# Patient Record
Sex: FEMALE | Race: WHITE | NOT HISPANIC OR LATINO | Employment: UNEMPLOYED | URBAN - METROPOLITAN AREA
[De-identification: names, ages, dates, MRNs, and addresses within clinical notes are randomized per-mention and may not be internally consistent; named-entity substitution may affect disease eponyms.]

---

## 2018-03-15 ENCOUNTER — OFFICE VISIT (OUTPATIENT)
Dept: SURGERY | Facility: CLINIC | Age: 53
End: 2018-03-15
Payer: COMMERCIAL

## 2018-03-15 VITALS
WEIGHT: 177 LBS | TEMPERATURE: 99 F | SYSTOLIC BLOOD PRESSURE: 133 MMHG | HEART RATE: 97 BPM | BODY MASS INDEX: 29.49 KG/M2 | HEIGHT: 65 IN | DIASTOLIC BLOOD PRESSURE: 84 MMHG

## 2018-03-15 DIAGNOSIS — K44.9 HERNIA, HIATAL: ICD-10-CM

## 2018-03-15 PROCEDURE — 99213 OFFICE O/P EST LOW 20 MIN: CPT | Mod: PBBFAC | Performed by: SURGERY

## 2018-03-15 PROCEDURE — 99999 PR PBB SHADOW E&M-EST. PATIENT-LVL III: CPT | Mod: PBBFAC,,, | Performed by: SURGERY

## 2018-03-15 PROCEDURE — 99203 OFFICE O/P NEW LOW 30 MIN: CPT | Mod: S$GLB,,, | Performed by: SURGERY

## 2018-03-15 RX ORDER — ALPRAZOLAM 1 MG/1
TABLET ORAL
COMMUNITY
Start: 2017-04-13 | End: 2018-10-01

## 2018-03-15 RX ORDER — CARVEDILOL 6.25 MG/1
TABLET ORAL
COMMUNITY
Start: 2018-02-15

## 2018-03-15 RX ORDER — DICLOFENAC SODIUM 10 MG/G
GEL TOPICAL
COMMUNITY
Start: 2018-01-29

## 2018-03-15 RX ORDER — ALPRAZOLAM 0.5 MG/1
TABLET ORAL
COMMUNITY
Start: 2018-02-22

## 2018-03-15 RX ORDER — DIPHENHYDRAMINE HCL 25 MG
25 CAPSULE ORAL
COMMUNITY

## 2018-03-15 RX ORDER — ASPIRIN 325 MG
TABLET, DELAYED RELEASE (ENTERIC COATED) ORAL
COMMUNITY
Start: 2017-03-22

## 2018-03-15 RX ORDER — PANTOPRAZOLE SODIUM 40 MG/1
40 TABLET, DELAYED RELEASE ORAL
COMMUNITY
Start: 2017-10-10 | End: 2018-10-01

## 2018-03-15 RX ORDER — KETOROLAC TROMETHAMINE 10 MG/1
10 TABLET, FILM COATED ORAL EVERY 8 HOURS PRN
Refills: 0 | COMMUNITY
Start: 2017-12-24

## 2018-03-15 RX ORDER — PROMETHAZINE HYDROCHLORIDE 25 MG/1
TABLET ORAL
COMMUNITY
Start: 2017-04-12

## 2018-03-15 RX ORDER — BACITRACIN 500 [USP'U]/G
1 OINTMENT OPHTHALMIC 2 TIMES DAILY
Refills: 0 | COMMUNITY
Start: 2017-12-24

## 2018-03-15 RX ORDER — CHOLESTYRAMINE 4 G/9G
POWDER, FOR SUSPENSION ORAL
Refills: 0 | COMMUNITY
Start: 2017-12-22 | End: 2018-10-01

## 2018-03-15 RX ORDER — AMOXICILLIN AND CLAVULANATE POTASSIUM 500; 125 MG/1; MG/1
TABLET, FILM COATED ORAL
COMMUNITY
Start: 2018-01-29 | End: 2018-10-01

## 2018-03-15 RX ORDER — ACETAMINOPHEN 325 MG/1
650 TABLET ORAL
COMMUNITY

## 2018-03-15 RX ORDER — SUCRALFATE 1 G/10ML
SUSPENSION ORAL
Refills: 5 | COMMUNITY
Start: 2017-12-24

## 2018-03-15 RX ORDER — HYDROCODONE BITARTRATE AND ACETAMINOPHEN 7.5; 3 MG/1; MG/1
TABLET ORAL
COMMUNITY
Start: 2018-01-16 | End: 2018-10-01

## 2018-03-15 RX ORDER — RIFAXIMIN 550 MG/1
TABLET ORAL
COMMUNITY
Start: 2018-02-15 | End: 2018-10-01

## 2018-03-15 RX ORDER — PROMETHAZINE HYDROCHLORIDE AND CODEINE PHOSPHATE 6.25; 1 MG/5ML; MG/5ML
SOLUTION ORAL
COMMUNITY
Start: 2018-01-29 | End: 2018-10-01

## 2018-03-15 RX ORDER — CARVEDILOL 25 MG/1
25 TABLET ORAL
COMMUNITY
End: 2018-10-01

## 2018-03-15 RX ORDER — CEPHALEXIN 500 MG/1
500 CAPSULE ORAL 2 TIMES DAILY
Refills: 0 | COMMUNITY
Start: 2017-12-24 | End: 2018-10-01

## 2018-03-15 RX ORDER — ONDANSETRON 4 MG/1
TABLET, ORALLY DISINTEGRATING ORAL
COMMUNITY
Start: 2017-05-23 | End: 2018-10-01

## 2018-03-15 RX ORDER — PANTOPRAZOLE SODIUM 20 MG/1
TABLET, DELAYED RELEASE ORAL
COMMUNITY
Start: 2018-02-23 | End: 2018-10-01

## 2018-03-15 RX ORDER — HYDROCODONE BITARTRATE AND ACETAMINOPHEN 7.5; 325 MG/1; MG/1
1 TABLET ORAL EVERY 4 HOURS PRN
Refills: 0 | COMMUNITY
Start: 2017-12-30

## 2018-03-15 RX ORDER — FLUOCINOLONE ACETONIDE 0.1 MG/ML
SOLUTION TOPICAL
COMMUNITY
Start: 2017-10-10

## 2018-03-15 RX ORDER — CYCLOBENZAPRINE HCL 5 MG
TABLET ORAL
COMMUNITY
Start: 2018-01-17

## 2018-03-15 RX ORDER — ACETAZOLAMIDE 500 MG/1
500 CAPSULE, EXTENDED RELEASE ORAL 2 TIMES DAILY
Refills: 3 | COMMUNITY
Start: 2017-12-22 | End: 2018-10-01

## 2018-03-15 RX ORDER — FLUOCINONIDE TOPICAL SOLUTION USP, 0.05% 0.5 MG/ML
SOLUTION TOPICAL
COMMUNITY
Start: 2018-01-29

## 2018-03-15 RX ORDER — MYCOPHENOLATE MOFETIL 500 MG/1
TABLET ORAL
COMMUNITY
Start: 2018-02-23

## 2018-03-15 RX ORDER — MUPIROCIN 20 MG/G
OINTMENT TOPICAL
COMMUNITY
Start: 2018-01-08 | End: 2020-08-18 | Stop reason: ALTCHOICE

## 2018-03-15 RX ORDER — PROMETHAZINE HYDROCHLORIDE 25 MG/1
SUPPOSITORY RECTAL
Refills: 1 | Status: ON HOLD | COMMUNITY
Start: 2017-12-24 | End: 2023-03-16 | Stop reason: HOSPADM

## 2018-03-15 NOTE — PROGRESS NOTES
History & Physical    SUBJECTIVE:     History of Present Illness:  Patient is a 52 y.o. female presents with large hiatal hernia with gerd.  Starting in 2007 she began to have reflux.  It has been worsening and now she has dysphagia, chest pain, cough, belching, regurgitation, nausea, bloating, vomiting and heartburn.    No chief complaint on file.      Review of patient's allergies indicates:   Allergen Reactions    Doxycycline     Iodine and iodide containing products Swelling       Current Outpatient Prescriptions   Medication Sig Dispense Refill    acetaminophen (TYLENOL) 325 MG tablet Take 650 mg by mouth.      ALPRAZolam (XANAX) 0.5 MG tablet       butalbital-aspirin-caffeine -40 mg (BUTALBITAL-ASPIRIN-CAFFEINE) -40 mg Cap Take 1 capsule by mouth every 6 (six) hours as needed.        carvedilol (COREG) 6.25 MG tablet       cyclobenzaprine (FLEXERIL) 5 MG tablet       diphenhydrAMINE (BENADRYL) 25 mg capsule Take 25 mg by mouth.      hydrocodone-acetaminophen (VICODIN ES) 7.5-750 mg per tablet Take 1 tablet by mouth every 8 (eight) hours as needed.        ketorolac (TORADOL) 10 mg tablet Take 10 mg by mouth every 8 (eight) hours as needed.  0    MEDICAL SUPPLY, MISCELLANEOUS (MISCELLANEOUS MEDICAL SUPPLY MISC) Patient has an implanted device with an external monitor.      mupirocin (BACTROBAN) 2 % ointment       mycophenolate (CELLCEPT) 500 mg Tab       pantoprazole (PROTONIX) 20 MG tablet       paregoric 2 mg/5 mL Liqd       promethazine (PHENERGAN) 25 MG tablet TAKE 1 TABLET BY MOUTH EVERY 4-6 HOURS AS NEEDED FOR NAUSEA.      ranitidine (ZANTAC) 150 MG tablet Take 150 mg by mouth.      XIFAXAN 550 mg Tab       acetaZOLAMIDE (DIAMOX) 500 mg CpSR Take 500 mg by mouth 2 (two) times daily.  3    ALPRAZolam (XANAX) 1 MG tablet TAKE 1 TABLET BY MOUTH EVERY 6 HOURS AS NEEDED      amoxicillin-clavulanate 500-125mg (AUGMENTIN) 500-125 mg Tab       aspirin (ECOTRIN) 325 MG EC tablet  TAKE 2 TABLETS BY MOUTH TWICE A DAY FOR 15 DAYS      bacitracin (AK-TRACIN) ophthalmic ointment 1 application 2 (two) times daily. Apply to affected area for seven days  0    CARAFATE 100 mg/mL suspension TAKE 2 TEASPOONFULS (10 MLS) BY MOUTH TWICE DAILY  5    carvedilol (COREG) 25 MG tablet Take 25 mg by mouth.      cephALEXin (KEFLEX) 500 MG capsule Take 500 mg by mouth 2 (two) times daily.  0    cholestyramine (QUESTRAN) 4 gram packet TAKE 1 SCOOP TWICE A DAY  0    diclofenac sodium 1 % Gel       fluocinolone (SYNALAR) 0.01 % external solution Apply to scalp as needed for itching      fluocinonide (LIDEX) 0.05 % external solution       HYDROcodone-acetaminophen 7.5-300 mg Tab       hydrocodone-acetaminophen 7.5-325mg (NORCO) 7.5-325 mg per tablet Take 1 tablet by mouth every 4 (four) hours as needed. for pain.  0    metoprolol tartrate (LOPRESSOR) 50 MG tablet Take 50 mg by mouth 2 (two) times daily.        ondansetron (ZOFRAN-ODT) 4 MG TbDL Take 1-2 tablets every 8 hours as needed for nausea.      pantoprazole (PROTONIX) 40 MG tablet Take 40 mg by mouth.      pregabalin (LYRICA) 25 MG capsule Take 1 capsule (25 mg total) by mouth 2 (two) times daily. 60 capsule 6    promethazine (PHENERGAN) 25 MG suppository INSERT 1 RECTALLY EVERY 8 HOURS AS NEEDED FOR NAUSEA  1    promethazine-codeine 6.25-10 mg/5 ml (PHENERGAN WITH CODEINE) 6.25-10 mg/5 mL syrup       tramadol (ULTRAM) 50 mg tablet        No current facility-administered medications for this visit.        Past Medical History:   Diagnosis Date    Acquired lymphedema     Chronic migraine     Hyperlipidemia     SVT (supraventricular tachycardia)      Past Surgical History:   Procedure Laterality Date     necrotizing fasciitis surgery      APPENDECTOMY      breast reduction  2007    cervical fusion       SECTION, LOW TRANSVERSE      LIPOSUCTION      TUBAL LIGATION       History reviewed. No pertinent family history.  Social  "History   Substance Use Topics    Smoking status: Former Smoker     Quit date: 10/11/1995    Smokeless tobacco: Former User    Alcohol use No        Review of Systems:  Review of Systems   Constitutional: Negative for fever and unexpected weight change.   Respiratory: Positive for shortness of breath.    Gastrointestinal: Positive for diarrhea. Negative for constipation.   Genitourinary: Negative for difficulty urinating and dysuria.   Skin: Positive for rash. Negative for wound.        dermatomyositis     Neurological: Positive for headaches. Negative for seizures.   Hematological: Does not bruise/bleed easily.       OBJECTIVE:     Vital Signs (Most Recent)  Temp: 98.5 °F (36.9 °C) (03/15/18 1530)  Pulse: 97 (03/15/18 1530)  BP: 133/84 (03/15/18 1530)  5' 5" (1.651 m)  80.3 kg (177 lb)     Physical Exam:  Physical Exam   Constitutional: She is oriented to person, place, and time. She appears well-developed and well-nourished.   Neck: Normal range of motion. Neck supple.   Cardiovascular: Normal rate, regular rhythm and normal heart sounds.    Pulmonary/Chest: Effort normal and breath sounds normal.   Abdominal: Soft. Bowel sounds are normal. She exhibits distension. There is tenderness.   Neurological: She is alert and oriented to person, place, and time.   Skin: Skin is warm and dry.   Psychiatric: She has a normal mood and affect. Her behavior is normal. Judgment and thought content normal.   Vitals reviewed.      Laboratory  None recent in system    Diagnostic Results:  EGD, Motility and pH: Reviewed  U/s: reviewed    ASSESSMENT/PLAN:     Severe gerd with large hiatal hernia and erosions.  Grossly abnormal ph and normal motility.  Has  shunt and dermatomyocytis (Alysia article for ctd and fundo 2016).    PLAN:    Considering lap hh with possible mesh, possible esophageal lengthening and Toupet fundoplication.  Obtain ges result.               "

## 2018-03-15 NOTE — LETTER
March 15, 2018      Spencer Bucio MD  [address]  [address]  [facility]  [place] [address]           [facility]  [address]  [place] [address]  Phone: [phone]          Patient: Hattie Villalpando   MR Number: [id number]   YOB: 1965   Date of Visit: 3/15/2018       Dear Dr. Spencer Bucio:    Thank you for referring Hattie Villalpando to me for evaluation. Attached you will find relevant portions of my assessment and plan of care.    If you have questions, please do not hesitate to call me. I look forward to following Hattie Villalpando along with you.    Sincerely,    Guido Bradley MD    Enclosure  CC:  No Recipients    If you would like to receive this communication electronically, please contact [email or web address] or [phone] to request more information on [employer] access.    For providers and/or their staff who would like to refer a patient to [facility], please contact us through our one-stop-shop provider referral line, [facility], at [phone].    If you feel you have received this communication in error or would no longer like to receive these types of communications, please e-mail [email or web address]

## 2018-04-02 ENCOUNTER — TELEPHONE (OUTPATIENT)
Dept: SURGERY | Facility: CLINIC | Age: 53
End: 2018-04-02

## 2018-04-02 NOTE — TELEPHONE ENCOUNTER
Surgery schedulers notified to put case on hold for now.   Left  for pt notifying her that we did receive her msg and case to be put on hold for now.

## 2018-04-02 NOTE — TELEPHONE ENCOUNTER
----- Message from Millie Chino sent at 4/2/2018  3:00 PM CDT -----  Contact: Pt.Self   Pt.States she Would like to cancel her surgery on 04/16/18 and does not wish to reschedule again for now Thanks :)

## 2018-07-16 DIAGNOSIS — R20.0 NUMBNESS OF RIGHT FOOT: ICD-10-CM

## 2018-07-16 DIAGNOSIS — M79.671 RIGHT FOOT PAIN: Primary | ICD-10-CM

## 2018-08-07 ENCOUNTER — CLINICAL SUPPORT (OUTPATIENT)
Dept: REHABILITATION | Facility: HOSPITAL | Age: 53
End: 2018-08-07
Payer: COMMERCIAL

## 2018-08-07 DIAGNOSIS — M25.673 DECREASED RANGE OF MOTION OF ANKLE: ICD-10-CM

## 2018-08-07 DIAGNOSIS — M79.671 RIGHT FOOT PAIN: Primary | ICD-10-CM

## 2018-08-07 DIAGNOSIS — R29.898 LEG WEAKNESS, BILATERAL: ICD-10-CM

## 2018-08-07 DIAGNOSIS — R26.9 IMPAIRED GAIT: ICD-10-CM

## 2018-08-07 PROCEDURE — 97162 PT EVAL MOD COMPLEX 30 MIN: CPT

## 2018-08-07 NOTE — PLAN OF CARE
[facility]  Physical Therapy Initial Evaluation    Name: Hattie Villalpando  Clinic Number: [id number]    Therapy Diagnosis:   Encounter Diagnoses   Name Primary?    Impaired gait     Right foot pain Yes    Decreased range of motion of ankle     Leg weakness, bilateral      Physician: Gena Luz MD    Physician Orders: PT Eval and Treat   Medical Diagnosis:   M79.671 (ICD-10-CM) - Right foot pain   R20.0 (ICD-10-CM) - Numbness of right foot     Evaluation Date: 2018  Authorization Period Expiration: 2018  Plan of Care Certification Period:   Visit # / Visits authorized:     Time In: 11:01 am  Time Out: 11:36 am  Total Billable Time: 35 minutes    Precautions: CRPS (reflex sympathetic dystrophy)    Subjective   Date of onset: ; been wearing boot for 2 years.   History of current condition - Hattie reports: that her R foot being in boot in last few years caused her right hip and right side of body to bother her. Pt reports with headache upon treatment. Pt reports she had a recent brain surgery. Pt had a hysterectomy and hit a nerve which caused the ankle to go numb. She states it has been getting progressively worse. Pt reports she has HO sprained L ankle and fractures multiple times. Pt reports that she takes L ankle boot off at home. Pt states boot keeps rest of body out of wack.        Past Medical History:   Diagnosis Date    Acquired lymphedema     Chronic migraine     Complex regional pain syndrome     Hx of dermatomyositis     Hyperlipidemia     IBS (irritable bowel syndrome)     SVT (supraventricular tachycardia)      Hattie Villalpando  has a past surgical history that includes breast reduction ();  necrotizing fasciitis surgery; Liposuction; Appendectomy; Tubal ligation; cervical fusion;  section, low transverse; and CSF shunt.    Hattie has a current medication list which includes the following prescription(s): acetaminophen, acetazolamide,  alprazolam, alprazolam, amoxicillin-clavulanate 500-125mg, aspirin, bacitracin, butalbital-aspirin-caffeine -40 mg, carafate, carvedilol, carvedilol, cephalexin, cholestyramine, cyclobenzaprine, diclofenac sodium, diphenhydramine, fluocinolone, fluocinonide, hydrocodone-acetaminophen, hydrocodone-acetaminophen, hydrocodone-acetaminophen, ketorolac, medical supply, miscellaneous, metoprolol tartrate, mupirocin, mycophenolate, ondansetron, pantoprazole, pantoprazole, paregoric, pregabalin, promethazine, promethazine, promethazine-codeine 6.25-10 mg/5 ml, ranitidine, tramadol, and xifaxan.    Review of patient's allergies indicates:   Allergen Reactions    Doxycycline     Iodine and iodide containing products Swelling          Prior Therapy: PT last year on land  Social History:  lives with their family  Occupation: not working; on disability  Prior Level of Function: use of caregiver   Current Level of Function: use of caregiver; cannot wear a shoe, cannot ride bike or walk long distances. Depends on day pt may have difficulty.     Pain:  Current 8/10, worst 10/10, best 5/10   Location: right feet   Description: Throbbing and cramps, spasms.   Aggravating Factors: Laying and Touching  Easing Factors: heating pad and elevation    Sleeping is disturbed.     Pts goals: keep pt mobile and maximize LOF; alleviate pain in body.     Objective       Functional assessment:   - walking: trendelenburg gait; foot slap on L. No heel contact on R foot 2/2 boot.   - sit to stand: WFL    Sensation: intact on LE's    Single leg balance: dysfunctional; unable to balance on L LE without holding onto plinth. Unable to bear weight fully on R LE    AROM  LE MMT  R  L    Hip flexion  2+/5  3-/5    Hip ER  3+/5  4/5    Hip IR  3+/5  4/5    Knee extension  3+/5  5/5    Knee flexion  3/5  5/5    Ankle dorsiflexion  2 5/5    Ankle plantar flexion  2 5/5    Ankle inversion  2 4/5    Ankle eversion  2 5/5      R ankle inversion AROM 16  degrees  R ankle eversion AROM 10 degrees  L ankle inversion AROM 50 degrees  L ankle enversion AROM 32 degrees        CMS Impairment/Limitation/Restriction for FOTO Foot Survey    Therapist reviewed FOTO scores for Hattie Villalpando on 8/7/2018.   FOTO documents entered into [employer] - see Media section.    Limitation Score: 63%  Category: Mobility    Current : CL = least 60% but < 80% impaired, limited or restricted  Goal: CK = at least 40% but < 60% impaired, limited or restricted         Assessment   Hattie is a 52 y.o. female referred to outpatient Physical Therapy with a medical diagnosis of M79.671 (ICD-10-CM) - Right foot pain and R20.0 (ICD-10-CM) - Numbness of right foot. Pt presents with impaired gait, diffuse pain over body, tingling in R foot, and decreased strength and ROM. Pt eval complete and POC initiated. Pt was guarded throughout evaluation 2/2 CRPS and dermatomyositis. Evaluation was completed very cautiously and many tests/measures were avoided in pt's best interests. Pt noted that today was not a great day as well. PT reviewed aquatic therapy information and precautions with patient. PT cleared pt for aquatic therapy. Aquatic therapy will be initiated as pt is not a good candidate for land based therapy at this time.       Pt prognosis is guarded.  Pt will benefit from skilled outpatient Physical Therapy to address the deficits stated above and in the chart below, provide pt/family education, and to maximize pt's level of independence.     Plan of care discussed with patient: Yes  Pt's spiritual, cultural and educational needs considered and patient is agreeable to the plan of care and goals as stated below:     Anticipated Barriers for therapy: allodynia     Medical Necessity is demonstrated by the following  History  Co-morbidities and personal factors that may impact the plan of care Co-morbidities:   IBS, CRPS, and dermatomyositis    Personal Factors:   no deficits     moderate   Examination  Body  Structures and Functions, activity limitations and participation restrictions that may impact the plan of care Body Regions:   neck  back  lower extremities    Body Systems:    ROM  strength  balance  gait  motor learning    Participation Restrictions:   Walking and bicycling    Activity limitations:   Learning and applying knowledge  no deficits    General Tasks and Commands  no deficits    Communication  no deficits    Mobility  walking  driving (bike, car, motorcycle)    Self care  looking after one's health    Domestic Life  doing house work (cleaning house, washing dishes, laundry)    Interactions/Relationships  no deficits    Life Areas  employment    Community and Social Life  recreation and leisure         moderate   Clinical Presentation evolving clinical presentation with changing clinical characteristics moderate   Decision Making/ Complexity Score: moderate     Goals:  Short Term Goals: 4 weeks   1. Independent with HEP.  2. Report decreased LE < or =  4/10 with adls such as walking, dressing, and bathing  3. Increased AROM of L ankle to level of R ankle to promote proper gait mobility  Long Term Goals: 8 weeks   4. Report ability to ride bike again with pain </= 3/10 in R foot   5. Increased MMT for B hip and knee musculature by 1 muscle grade to maximize LOF    6. Pt will keep level of strength and ROM in bilateral LE's to maintain current LOF  7. Patient to achieve level on the FOTO Outcomes Measurement System- CK = at least 40% but < 60% impaired, limited or restricted    Plan   Certification Period/Plan of care expiration: 8/7/2018 to 10/1/18.    Outpatient Physical Therapy 2 times weekly for 8 weeks to include the following interventions: Aquatic Therapy, Electrical Stimulation IFC, Manual Therapy, Moist Heat/ Ice, Neuromuscular Re-ed, Patient Education, Self Care, Therapeutic Activites and Therapeutic Exercise.     Forest Wilson, SPT

## 2018-08-13 ENCOUNTER — CLINICAL SUPPORT (OUTPATIENT)
Dept: REHABILITATION | Facility: HOSPITAL | Age: 53
End: 2018-08-13
Payer: COMMERCIAL

## 2018-08-13 DIAGNOSIS — M79.671 RIGHT FOOT PAIN: ICD-10-CM

## 2018-08-13 DIAGNOSIS — M25.673 DECREASED RANGE OF MOTION OF ANKLE: ICD-10-CM

## 2018-08-13 DIAGNOSIS — R29.898 LEG WEAKNESS, BILATERAL: ICD-10-CM

## 2018-08-13 DIAGNOSIS — R26.9 IMPAIRED GAIT: Primary | ICD-10-CM

## 2018-08-13 PROCEDURE — 97113 AQUATIC THERAPY/EXERCISES: CPT

## 2018-08-13 NOTE — PROGRESS NOTES
"Time In: 1600  Time Out: 1700    Subjective  Pt reports: "What I want is to stay out of a wheel chair."      Objective  Treatment: Pt was instructed in and performed therapeutic exercises to develop B hip/core stabilization, BLE flexibility/strength and spinal mobility  for 60 minutes. Patient performed therapeutic exercises consisting of the following exercises:    Warm-up Laps 2 x each  fwd/bkw/lat     Stretches: 2 x 30 sec  HS LLE only  T spine rotation with noodle 2 min  Seated trunk flexion stretch with noodle x 20      LE exs in // bars: 20x each   Mini Squats with QS  Heel Raise with GS  Hip flx/ext  Hip abd/add  Hip flex/LAQ  Hip ext/HS  Hip cw/ccw circles    UE exs: 20x each  Shld flex/ext apo   Shld abd/add apo  Lat pull ytb     Cool down laps 1 x each  fwd/lat    Patient was not issued HEP for pool.    Assessment  Pt's tolerated initial aquatic tx well w/ no increase in symptoms. Pt was limited by inability to weightbear properly on L foot secondary to Complex Regional Pain Syndrome. Tx focused on BLE flexibility/strengthening and hip/core stabilization.  Will progress as tolerated. Patient will continue to benefit from skilled PT intervention.    Hattie Villalpando is making good progress towards established goals.    Anticipated barriers to physical therapy: None    Plan  Continue with POC established by PT.       "

## 2018-08-15 ENCOUNTER — CLINICAL SUPPORT (OUTPATIENT)
Dept: REHABILITATION | Facility: HOSPITAL | Age: 53
End: 2018-08-15
Payer: COMMERCIAL

## 2018-08-15 DIAGNOSIS — M25.673 DECREASED RANGE OF MOTION OF ANKLE: ICD-10-CM

## 2018-08-15 DIAGNOSIS — R29.898 LEG WEAKNESS, BILATERAL: ICD-10-CM

## 2018-08-15 DIAGNOSIS — R26.9 IMPAIRED GAIT: Primary | ICD-10-CM

## 2018-08-15 DIAGNOSIS — M79.671 RIGHT FOOT PAIN: ICD-10-CM

## 2018-08-15 PROCEDURE — 97113 AQUATIC THERAPY/EXERCISES: CPT

## 2018-08-15 NOTE — PROGRESS NOTES
Time In: 1600  Time Out: 1700    Subjective  Pt reports: soreness post last tx visit especially in B gluts.      Objective  Treatment: Pt was instructed in and performed therapeutic exercises to develop B hip/core stabilization, BLE flexibility/strength and spinal mobility  for 60 minutes. Patient performed therapeutic exercises consisting of the following exercises:    Warm-up Laps 2 x each  fwd/lat     Stretches: 2 x 30 sec  HS LLE only     Levator Scap   Upper trap  T spine rotation with noodle 2 min  Seated trunk flexion stretch with noodle 2 min      LE exs in // bars: 25x each   Mini Squats with QS  Hip flx/ext  Hip abd/add  Hip flex/LAQ  Hip ext/HS  Hip cw/ccw circles    UE exs: 25x each  Shld flex/ext apo   Shld abd/add apo  Lat pull ytb  Rows ytb     Cool down laps 2 x each   Fwd/lat      Patient was not issued HEP for pool.    Assessment  Pt's tolerated aquatic tx progression well w/ no increase in symptoms. It was noted that pt only reported minor mm soreness post last tx visit. Pt was limited by inability to weightbear properly on L foot secondary to Complex Regional Pain Syndrome. Tx focused on BLE flexibility/strengthening and hip/core stabilization.  Will progress as tolerated. Patient will continue to benefit from skilled PT intervention.    Hattie Villalpando is making good progress towards established goals.    Anticipated barriers to physical therapy: None    Plan  Continue with POC established by PT.

## 2018-08-20 ENCOUNTER — CLINICAL SUPPORT (OUTPATIENT)
Dept: REHABILITATION | Facility: HOSPITAL | Age: 53
End: 2018-08-20
Payer: COMMERCIAL

## 2018-08-20 DIAGNOSIS — M79.671 RIGHT FOOT PAIN: ICD-10-CM

## 2018-08-20 DIAGNOSIS — R26.9 IMPAIRED GAIT: Primary | ICD-10-CM

## 2018-08-20 DIAGNOSIS — M25.673 DECREASED RANGE OF MOTION OF ANKLE: ICD-10-CM

## 2018-08-20 DIAGNOSIS — R29.898 LEG WEAKNESS, BILATERAL: ICD-10-CM

## 2018-08-20 PROCEDURE — 97113 AQUATIC THERAPY/EXERCISES: CPT

## 2018-08-22 ENCOUNTER — CLINICAL SUPPORT (OUTPATIENT)
Dept: REHABILITATION | Facility: HOSPITAL | Age: 53
End: 2018-08-22
Payer: COMMERCIAL

## 2018-08-22 DIAGNOSIS — M25.673 DECREASED RANGE OF MOTION OF ANKLE: ICD-10-CM

## 2018-08-22 DIAGNOSIS — R26.9 IMPAIRED GAIT: Primary | ICD-10-CM

## 2018-08-22 DIAGNOSIS — M79.671 RIGHT FOOT PAIN: ICD-10-CM

## 2018-08-22 DIAGNOSIS — R29.898 LEG WEAKNESS, BILATERAL: ICD-10-CM

## 2018-08-22 PROCEDURE — 97113 AQUATIC THERAPY/EXERCISES: CPT

## 2018-08-22 NOTE — PROGRESS NOTES
Time In: 1330  Time Out: 1430    Subjective  Pt had some minor muscle soreness post last tx visit; arrived today with R sided low and mid back tightness.     Objective  Treatment: Pt was instructed in and performed therapeutic exercises to develop B hip/core stabilization, BLE flexibility/strength and spinal mobility  for 60 minutes, 30 min one - one. Patient performed therapeutic exercises consisting of the following exercises:    Warm-up Laps 3 x each  fwd/lat     Stretches: 2 x 30 sec  HS LLE only     Levator Scap   Upper trap   T spine rotation with noodle 2 min  IT band stretch 10 x 10 sec  Seated trunk flexion stretch with noodle 2 min - np     LE exs in // bars: 30x each   Mini Squats with QS  Hip flx/ext  Hip abd/add  Hip flex/LAQ  Hip ext/HS  Hip cw/ccw circles  Steps ups on LLE only  Hip hiking x 15 bilaterally for pelvic mobility np secondary to LBP  Football squeeze x 10 np  Noodle bicycle x 3 min   Balance - staggered stance 2 x 15 sec each leg     UE exs: 30x each  Shld flex/ext apo   Shld abd/add apo  Disc submersion c/ TA activation 10 x 10 sec holds   Lat pull ytb- np  Rows ytb - np     Cool down laps 2 x each   Fwd/lat      Patient was HEP for pool at home including noodle bike, B hip flex for abdominal activtation in deep water and hip abd/add in deep water    Assessment  Pt arrived with R sided lumbar/thoracic musculature tightness. Pt appears to be hiking R hip with QL in order to clear foot due to the leg length discrepancy caused by the walking boot. Pt could benefit from heel lift on L shoe in order to minimize leg length discrepancy and decrease R sided low/mid back tightness. Pt tolerated today's aquatic tx well with no increase in symptoms. Pt resumed cervical stretches during today's tx visit in order to promote cervical mobility. Progress slowly secondary to Complex Regional Pain Syndrome. Tx focused on BLE flexibility/strengthening and hip/core stabilization.  Will progress as  tolerated. Patient will continue to benefit from skilled PT intervention.    Hattie Villalpando is making good progress towards established goals.    Anticipated barriers to physical therapy: None    Plan  Continue with POC established by PT.     Treatment and note completed by Jordan Siu PTA

## 2018-09-04 ENCOUNTER — CLINICAL SUPPORT (OUTPATIENT)
Dept: REHABILITATION | Facility: HOSPITAL | Age: 53
End: 2018-09-04
Payer: COMMERCIAL

## 2018-09-04 DIAGNOSIS — M79.671 RIGHT FOOT PAIN: ICD-10-CM

## 2018-09-04 DIAGNOSIS — R29.898 LEG WEAKNESS, BILATERAL: ICD-10-CM

## 2018-09-04 DIAGNOSIS — M25.673 DECREASED RANGE OF MOTION OF ANKLE: ICD-10-CM

## 2018-09-04 DIAGNOSIS — R26.9 IMPAIRED GAIT: Primary | ICD-10-CM

## 2018-09-04 PROCEDURE — G8978 MOBILITY CURRENT STATUS: HCPCS | Mod: CL

## 2018-09-04 PROCEDURE — G8979 MOBILITY GOAL STATUS: HCPCS | Mod: CK

## 2018-09-04 PROCEDURE — 97113 AQUATIC THERAPY/EXERCISES: CPT

## 2018-09-04 NOTE — PROGRESS NOTES
Time In: 1300  Time Out: 1400    Subjective  Pt is not feeling well today. She had colonscopy last week and IVIG infusion last week which makes her joint pain increase for about a week. She was diagnosed with small intestine inflammation and obstruction after colostomy which may require a hospitalization. She is anxious to get back in the pool today because aquatic therapy is what allows her to be mobile and relieve joint pain.     Objective     Functional assessment:   - walking: trendelenburg gait; No heel contact on R foot 2/2 boot.   - sit to stand: WFL     Sensation: intact on LE's     Single leg balance: dysfunctional; unable to balance on L LE without holding onto plinth. Unable to bear weight fully on R LE     AROM  LE MMT  R  L    Hip flexion  3/5  3+/5   Hip ER  3+/5  4/5    Hip IR  3+/5  4/5    Knee extension  3+/5  4/5    Knee flexion  3/5  4/5   Ankle dorsiflexion  2 4+/5    Ankle plantar flexion  2 4+/5    Ankle inversion  2 4+/5    Ankle eversion  2 4+/5       R ankle inversion AROM 16 degrees  R ankle eversion AROM 10 degrees  L ankle inversion AROM 50 degrees  L ankle enversion AROM 32 degrees           CMS Impairment/Limitation/Restriction for FOTO Foot Survey     Therapist reviewed FOTO scores for Hattie Villalpando on 8/7/2018.   FOTO documents entered into [employer] - see Media section.     Limitation Score: 67%  Category: Mobility     Current : CL = least 60% but < 80% impaired, limited or restricted  Goal: CK = at least 40% but < 60% impaired, limited or restricted         Treatment: Pt was instructed in and performed therapeutic exercises to develop B hip/core stabilization, BLE flexibility/strength and spinal mobility  for 60 minutes, 30 min one - one. Patient performed therapeutic exercises consisting of the following exercises:    Warm-up Laps 3 x each  fwd/lat     Stretches: 2 x 30 sec  HS LLE only     Levator Scap   Upper trap   T spine rotation with noodle 2 min  IT band stretch 10 x 10  sec  Seated trunk flexion stretch with noodle 2 min - np     LE exs in // bars: 30x each   Mini Squats with QS  Hip flx/ext  Hip abd/add  Hip flex/LAQ  Hip ext/HS  Hip cw/ccw circles  Steps ups on LLE only  Hip hiking x 15 bilaterally for pelvic mobility   Football squeeze x 10 np  Noodle bicycle x 3 min   Balance - staggered stance 2 x 15 sec each leg     UE exs: 30x each  Shld flex/ext apo   Shld abd/add apo  Disc submersion c/ TA activation 10 x 10 sec holds   Lat pull ytb- np  Rows ytb - np     Cool down laps 2 x each   Fwd/lat      Patient was HEP for pool at home including noodle bike, B hip flex for abdominal activtation in deep water and hip abd/add in deep water    Assessment  Pt arrived Progress slowly secondary to Complex Regional Pain Syndrome. Tx focused on BLE flexibility/strengthening and hip/core stabilization.  Will progress as tolerated. Patient will continue to benefit from skilled PT intervention.    Hattie Villalpando is making good progress towards established goals.    Anticipated barriers to physical therapy: None     Updated Goals:  Short Term Goals: 4 weeks   1. Independent with HEP.  2. Report decreased LE < or =  4/10 with adls such as walking, dressing, and bathing  3. Pt will be able to walk through grocery store without increased pain.  Long Term Goals: 8 weeks   4. Report ability to ride bike again with pain </= 3/10 in R foot   5. Increase strength in trunk and diaphragm to improve endurance and tolerance to functional activities    6. Pt will keep level of strength and ROM in bilateral LE's to maintain current LOF  7. Patient to achieve level on the FOTO Outcomes Measurement System- CK = at least 40% but < 60% impaired, limited or restricted    PLAN    Continue with POC  2x wk x 8 wks  Cheryl San, PT

## 2018-09-06 ENCOUNTER — CLINICAL SUPPORT (OUTPATIENT)
Dept: REHABILITATION | Facility: HOSPITAL | Age: 53
End: 2018-09-06
Payer: COMMERCIAL

## 2018-09-06 DIAGNOSIS — R26.9 IMPAIRED GAIT: Primary | ICD-10-CM

## 2018-09-06 DIAGNOSIS — R29.898 LEG WEAKNESS, BILATERAL: ICD-10-CM

## 2018-09-06 DIAGNOSIS — M79.671 RIGHT FOOT PAIN: ICD-10-CM

## 2018-09-06 DIAGNOSIS — M25.673 DECREASED RANGE OF MOTION OF ANKLE: ICD-10-CM

## 2018-09-06 PROCEDURE — 97113 AQUATIC THERAPY/EXERCISES: CPT

## 2018-09-06 NOTE — PROGRESS NOTES
Physical Therapy Aquatic Treatment Note     Name: Hattie Villalpando  Clinic Number: [id number]    Therapy Diagnosis:   Encounter Diagnoses   Name Primary?    Impaired gait Yes    Right foot pain     Decreased range of motion of ankle     Leg weakness, bilateral      Physician: Gena Luz MD  Visit Date: 9/6/2018  Physician Orders: PT Eval and Treat   Medical Diagnosis:   M79.671 (ICD-10-CM) - Right foot pain   R20.0 (ICD-10-CM) - Numbness of right foot      Evaluation Date: 8/7/2018  Authorization Period Expiration: 12/31/2018  Plan of Care Certification Period:   Visit # / Visits authorized: 1/20     Time In: 3:00  Time Out: 4:00  Total Billable Time:60 minutes     Precautions: CRPS (reflex sympathetic dystrophy)      Subjective     Pt reports: she was compliant with home exercise program given last session. Pt states that her R LE pn is 6/10 from hip to foot upon arrival, 5-6/10 post tx    Pain: 6/10  Location: right  leg    Objective     Hattie received aquatic exercises to develop strength, endurance, ROM, flexibility, posture, core stabilization and gait for 60 minutes including:    Warm-up Laps 3x each  FWD,BWD,Side    Stretches 3 x 20 sec  HSS  GSS  Soleus  ITB    LE exs 30x each  Mini Squat with QS  Hip flex/ext  Hip ABD/ADD  Hip Circles CW/CCW  Hip hiking x 15 R only for pelvic mobility   Tandem stance bal 3 x 20 sec B  John Chi walking 1 lap  Yellow ball submersion with TA activation 30x 3 sec hold  seated R ankle EV     Endurance 4 min  Bicycle on orange noodle    Cool Down Laps 1 x each  FWD,BWD,Side      Home Exercises Provided and Patient Education Provided     Education provided:   - progress towards goals   - role of therapy     Written Home Exercises Provided: Patient was not issued HEP for pool.  Exercises were reviewed and Hattie was able to demonstrate them prior to the end of the session.   Pt received a written copy of exercises to perform at home.     Hattie demonstrated good  understanding of  the education provided.     Assessment     Pt junior tx with ther ex well, good effort with all activities , few VCs required for John Chi walking and seated ankle EV. Pt ambs with R ankle IV, improved supination with John Chi walking.  Hattie is progressing well towards her goals.   Pt prognosis is Good.     Pt will continue to benefit from skilled outpatient physical therapy to address the deficits listed in the problem list box on initial evaluation, provide pt/family education and to maximize pt's level of independence in the home and community environment.     Pt's spiritual, cultural and educational needs considered and pt agreeable to plan of care and goals.    Anticipated barriers to physical therapy:  allodynia       Goals:   Short Term Goals: 4 weeks   1. Independent with HEP.  2. Report decreased LE < or =  4/10 with adls such as walking, dressing, and bathing  3. Increased AROM of L ankle to level of R ankle to promote proper gait mobility  Long Term Goals: 8 weeks   4. Report ability to ride bike again with pain </= 3/10 in R foot   5. Increased MMT for B hip and knee musculature by 1 muscle grade to maximize LOF    6. Pt will keep level of strength and ROM in bilateral LE's to maintain current LOF  7. Patient to achieve level on the FOTO Outcomes Measurement System- CK = at least 40% but < 60% impaired, limited or restricted        Plan     Continue 2x per week. Outpatient Physical Therapy 2 times weekly for 8 weeks to include the following interventions: Aquatic Therapy, Electrical Stimulation IFC, Manual Therapy, Moist Heat/ Ice, Neuromuscular Re-ed, Patient Education, Self Care, Therapeutic Activites and Therapeutic Exercise.         Shamar Campbell, PTA

## 2018-09-10 ENCOUNTER — CLINICAL SUPPORT (OUTPATIENT)
Dept: REHABILITATION | Facility: HOSPITAL | Age: 53
End: 2018-09-10
Payer: COMMERCIAL

## 2018-09-10 DIAGNOSIS — M79.671 RIGHT FOOT PAIN: ICD-10-CM

## 2018-09-10 DIAGNOSIS — R26.9 IMPAIRED GAIT: Primary | ICD-10-CM

## 2018-09-10 DIAGNOSIS — R29.898 LEG WEAKNESS, BILATERAL: ICD-10-CM

## 2018-09-10 DIAGNOSIS — M25.673 DECREASED RANGE OF MOTION OF ANKLE: ICD-10-CM

## 2018-09-10 PROCEDURE — 97113 AQUATIC THERAPY/EXERCISES: CPT

## 2018-09-10 NOTE — PROGRESS NOTES
"  Physical Therapy Aquatic Treatment Note     Name: Hattie Villalpando  Clinic Number: [id number]    Therapy Diagnosis:   Encounter Diagnoses   Name Primary?    Impaired gait Yes    Right foot pain     Decreased range of motion of ankle     Leg weakness, bilateral      Physician: Gena Luz MD  Visit Date: 9/10/2018  Physician Orders: PT Eval and Treat   Medical Diagnosis:   M79.671 (ICD-10-CM) - Right foot pain   R20.0 (ICD-10-CM) - Numbness of right foot      Evaluation Date: 8/7/2018  Authorization Period Expiration: 12/31/2018  Plan of Care Certification Period:   Visit # / Visits authorized: 8/20     Time In: 3:00  Time Out: 4:00  Total Billable Time:60 minutes     Precautions: CRPS (reflex sympathetic dystrophy)      Subjective     Pt reports: she was feeling good today. She is in the "good" week after her IVG treatment.  Her right calf is in spasm today, but not too different than normal.   Pain: 6/10  Location: right  leg    Objective     Hattie received aquatic exercises to develop strength, endurance, ROM, flexibility, posture, core stabilization and gait for 60 minutes including:    Warm-up Laps 3x each  FWD,BWD,Side    Stretches 3 x 20 sec  HSS  GSS  Soleus  ITB    LE exs 30x each  Mini Squat with QS  Hip flex/ext  Hip ABD/ADD  Hip Circles CW/CCW  Hip hiking x 15 R only for pelvic mobility   Tandem stance bal 3 x 20 sec B  John Chi walking 1 lap  Yellow ball submersion with TA activation 30x 3 sec hold  seated R ankle EV   Walking backwards with increased speed and cues for ham/glut activation x 2 laps    Endurance 4 min  Bicycle on orange noodle  Marching in place  Kicking with kickboard     Cool Down Laps 1 x each  FWD,BWD,Side      Home Exercises Provided and Patient Education Provided     Education provided:   - progress towards goals   - role of therapy     Written Home Exercises Provided: Patient was not issued HEP for pool.  Exercises were reviewed and Hattie was able to demonstrate them prior to the " end of the session.   Pt received a written copy of exercises to perform at home.     Hattie demonstrated good  understanding of the education provided.     Assessment     Pt junior tx with ther ex well, good effort with all activities , few VCs required for John Chi walking.  Pt will benefit from increasing endurance exercises to increase cardiac demands and strengthen intercoastals to assist in breathing.  Hattie is progressing well towards her goals.   Pt prognosis is Good.     Pt will continue to benefit from skilled outpatient physical therapy to address the deficits listed in the problem list box on initial evaluation, provide pt/family education and to maximize pt's level of independence in the home and community environment.     Pt's spiritual, cultural and educational needs considered and pt agreeable to plan of care and goals.    Anticipated barriers to physical therapy:  allodynia       Goals:   Short Term Goals: 4 weeks   1. Independent with HEP.  2. Report decreased LE < or =  4/10 with adls such as walking, dressing, and bathing  3. Increased AROM of L ankle to level of R ankle to promote proper gait mobility  Long Term Goals: 8 weeks   4. Report ability to ride bike again with pain </= 3/10 in R foot   5. Increased MMT for B hip and knee musculature by 1 muscle grade to maximize LOF    6. Pt will keep level of strength and ROM in bilateral LE's to maintain current LOF  7. Patient to achieve level on the FOTO Outcomes Measurement System- CK = at least 40% but < 60% impaired, limited or restricted        Plan     Continue 2x per week. Outpatient Physical Therapy 2 times weekly for 8 weeks to include the following interventions: Aquatic Therapy, Electrical Stimulation IFC, Manual Therapy, Moist Heat/ Ice, Neuromuscular Re-ed, Patient Education, Self Care, Therapeutic Activites and Therapeutic Exercise.         Cheryl San, PT

## 2018-09-14 ENCOUNTER — CLINICAL SUPPORT (OUTPATIENT)
Dept: REHABILITATION | Facility: HOSPITAL | Age: 53
End: 2018-09-14
Payer: COMMERCIAL

## 2018-09-14 DIAGNOSIS — M25.673 DECREASED RANGE OF MOTION OF ANKLE: ICD-10-CM

## 2018-09-14 DIAGNOSIS — M79.671 RIGHT FOOT PAIN: ICD-10-CM

## 2018-09-14 DIAGNOSIS — R29.898 LEG WEAKNESS, BILATERAL: ICD-10-CM

## 2018-09-14 DIAGNOSIS — R26.9 IMPAIRED GAIT: Primary | ICD-10-CM

## 2018-09-14 PROCEDURE — 97113 AQUATIC THERAPY/EXERCISES: CPT

## 2018-09-14 NOTE — PROGRESS NOTES
Physical Therapy Aquatic Treatment Note     Name: Hattie Villalpando  Clinic Number: [id number]    Therapy Diagnosis:   Encounter Diagnoses   Name Primary?    Impaired gait Yes    Right foot pain     Decreased range of motion of ankle     Leg weakness, bilateral      Physician: Gena Luz MD  Visit Date: 9/14/2018  Physician Orders: PT Eval and Treat   Medical Diagnosis:   M79.671 (ICD-10-CM) - Right foot pain   R20.0 (ICD-10-CM) - Numbness of right foot      Evaluation Date: 8/7/2018  Authorization Period Expiration: 12/31/2018  Plan of Care Certification Period:   Visit # / Visits authorized: 9/20     Time In: 1000  Time Out: 1100  Total Billable Time: 30  Minutes, 30 min group     Precautions: CRPS (reflex sympathetic dystrophy)      Subjective     Pt reports: she was having a lot of pain and spasms in the R foot and ankle starting yesterday.  Despite high pain level she wants to push through with exercises  Pain: 68/10  Location: right foot, lower leg    Objective     Hattie received aquatic exercises to develop strength, endurance, ROM, flexibility, posture, core stabilization and gait for 60 minutes including.  Pt completed 45 min of continuous exercises to increase heart and respiratory rate to challenge endurance and respiratory muscles.     Warm-up Laps x 10 min   FWD,BWD,Side    Stretches 3 x 20 sec - held stretches today.   HSS  GSS  Soleus  ITB    LE exs 30x each  Marching with elbow to knee and abdominal contraction 4 squares, starting with 8 reps each  Two march followed by jumping hiwot x 10.  DB boxing x 2 min  DB depression x 2 min  Walking backwards with increased speed and cues for ham/glut activation x 2 laps  Lateral lunges x 10  Bicycle on noodle 2 x 3 min  DKC followed by B hip abd/add on noodle 2 x 3 min  Marching in place     Watsu stretching x 8 min focusing on R abdominals and hips.     Cool Down Laps 1 x each  FWD,BWD,Side      Home Exercises Provided and Patient Education Provided      Education provided:   - progress towards goals   - role of therapy     Written Home Exercises Provided: Patient was not issued HEP for pool.  Exercises were reviewed and Hattie was able to demonstrate them prior to the end of the session.   Pt received a written copy of exercises to perform at home.     Hattie demonstrated good  understanding of the education provided.     Assessment     Pt junior tx with ther ex well, good effort with all activities.  She was limited on jumping jacks with pain in R foot.  She had an improvement in R hip and leg symptoms after treatment.  Pt will benefit from increasing endurance exercises to increase cardiac demands and strengthen intercoastals to assist in breathing.  Hattie is progressing well towards her goals.   Pt prognosis is Good.     Pt will continue to benefit from skilled outpatient physical therapy to address the deficits listed in the problem list box on initial evaluation, provide pt/family education and to maximize pt's level of independence in the home and community environment.     Pt's spiritual, cultural and educational needs considered and pt agreeable to plan of care and goals.    Anticipated barriers to physical therapy:  allodynia       Goals:   Short Term Goals: 4 weeks   1. Independent with HEP.  2. Report decreased LE < or =  4/10 with adls such as walking, dressing, and bathing  3. Increased AROM of L ankle to level of R ankle to promote proper gait mobility  Long Term Goals: 8 weeks   4. Report ability to ride bike again with pain </= 3/10 in R foot   5. Increased MMT for B hip and knee musculature by 1 muscle grade to maximize LOF    6. Pt will keep level of strength and ROM in bilateral LE's to maintain current LOF  7. Patient to achieve level on the FOTO Outcomes Measurement System- CK = at least 40% but < 60% impaired, limited or restricted        Plan     Continue 2x per week. Outpatient Physical Therapy 2 times weekly for 8 weeks to include the  following interventions: Aquatic Therapy, Electrical Stimulation IFC, Manual Therapy, Moist Heat/ Ice, Neuromuscular Re-ed, Patient Education, Self Care, Therapeutic Activites and Therapeutic Exercise.         Cheryl San, PT

## 2018-09-17 ENCOUNTER — CLINICAL SUPPORT (OUTPATIENT)
Dept: REHABILITATION | Facility: HOSPITAL | Age: 53
End: 2018-09-17
Payer: COMMERCIAL

## 2018-09-17 DIAGNOSIS — M25.673 DECREASED RANGE OF MOTION OF ANKLE: ICD-10-CM

## 2018-09-17 DIAGNOSIS — M79.671 RIGHT FOOT PAIN: ICD-10-CM

## 2018-09-17 DIAGNOSIS — R29.898 LEG WEAKNESS, BILATERAL: ICD-10-CM

## 2018-09-17 DIAGNOSIS — R26.9 IMPAIRED GAIT: Primary | ICD-10-CM

## 2018-09-17 PROCEDURE — 97113 AQUATIC THERAPY/EXERCISES: CPT

## 2018-09-17 NOTE — PROGRESS NOTES
Physical Therapy Aquatic Treatment Note     Name: Hattie Villalpando  Clinic Number: [id number]    Therapy Diagnosis:   Encounter Diagnoses   Name Primary?    Impaired gait Yes    Right foot pain     Decreased range of motion of ankle     Leg weakness, bilateral      Physician: Gena Luz MD  Visit Date: 9/17/2018  Physician Orders: PT Eval and Treat   Medical Diagnosis:   M79.671 (ICD-10-CM) - Right foot pain   R20.0 (ICD-10-CM) - Numbness of right foot      Evaluation Date: 8/7/2018  Authorization Period Expiration: 12/31/2018  Plan of Care Certification Period:   Visit # / Visits authorized: 10/20     Time In: 1600  Time Out: 1700  Total Billable Time: 30  Minutes, 30 min group     Precautions: CRPS (reflex sympathetic dystrophy)      Subjective     Pt reports: she was still having a lot of pain and spasms in the R foot.  Pt is not complaining but appears to be in a lot of pain.   Pain: 8/10  Location: right foot, lower leg    Objective     Hattie received aquatic exercises to develop strength, endurance, ROM, flexibility, posture, core stabilization and gait for 60 minutes including.  Pt completed 45 min of continuous exercises to increase heart and respiratory rate to challenge endurance and respiratory muscles.     Warm-up Laps x 10 min   FWD,BWD,Side    Stretches 3 x 20 sec - held stretches today.   HSS  GSS  Soleus  ITB    LE exs 30x each  Marching with elbow to knee and abdominal contraction 4 squares, starting with 8 reps each  DB boxing x 2 min  DB depression with wall squat 10 x 10 sec  Walking backwards with increased speed and cues for ham/glut activation x 2 laps  Lateral lunges x 10  Bicycle on noodle 2 x 3 min  DKC followed by B hip abd/add on noodle 2 x 3 min  Marching in place  Hip abd/add, flex ext    Watsu stretching x 15 min focusing on R abdominals and hips.     Cool Down Laps 1 x each  FWD,BWD,Side      Home Exercises Provided and Patient Education Provided     Education provided:   -  progress towards goals   - role of therapy     Written Home Exercises Provided: Patient was not issued HEP for pool.  Exercises were reviewed and Hattie was able to demonstrate them prior to the end of the session.   Pt received a written copy of exercises to perform at home.     Hattie demonstrated good  understanding of the education provided.     Assessment     Pt junior tx with ther ex well, good effort with all activities, but she was moving slower today.  She tolerates watsu stretching, but is very guarded with separation of LE and with cradle to extension motions.  She had an improvement in R hip and leg symptoms after treatment.  Pt will benefit from increasing endurance exercises to increase cardiac demands and strengthen intercoastals to assist in breathing.  Hattie is progressing well towards her goals.   Pt prognosis is Good.     Pt will continue to benefit from skilled outpatient physical therapy to address the deficits listed in the problem list box on initial evaluation, provide pt/family education and to maximize pt's level of independence in the home and community environment.     Pt's spiritual, cultural and educational needs considered and pt agreeable to plan of care and goals.    Anticipated barriers to physical therapy:  allodynia       Goals:   Short Term Goals: 4 weeks   1. Independent with HEP.  2. Report decreased LE < or =  4/10 with adls such as walking, dressing, and bathing  3. Increased AROM of L ankle to level of R ankle to promote proper gait mobility  Long Term Goals: 8 weeks   4. Report ability to ride bike again with pain </= 3/10 in R foot   5. Increased MMT for B hip and knee musculature by 1 muscle grade to maximize LOF    6. Pt will keep level of strength and ROM in bilateral LE's to maintain current LOF  7. Patient to achieve level on the FOTO Outcomes Measurement System- CK = at least 40% but < 60% impaired, limited or restricted        Plan     Continue 2x per week. Outpatient  Physical Therapy 2 times weekly for 8 weeks to include the following interventions: Aquatic Therapy, Electrical Stimulation IFC, Manual Therapy, Moist Heat/ Ice, Neuromuscular Re-ed, Patient Education, Self Care, Therapeutic Activites and Therapeutic Exercise.         Cheryl San, PT

## 2018-10-01 ENCOUNTER — TELEPHONE (OUTPATIENT)
Dept: NEUROLOGY | Facility: HOSPITAL | Age: 53
End: 2018-10-01

## 2018-10-01 ENCOUNTER — OFFICE VISIT (OUTPATIENT)
Dept: NEUROLOGY | Facility: HOSPITAL | Age: 53
End: 2018-10-01
Attending: INTERNAL MEDICINE
Payer: COMMERCIAL

## 2018-10-01 VITALS
WEIGHT: 160.5 LBS | SYSTOLIC BLOOD PRESSURE: 117 MMHG | DIASTOLIC BLOOD PRESSURE: 86 MMHG | BODY MASS INDEX: 26.74 KG/M2 | TEMPERATURE: 98 F | HEART RATE: 92 BPM | HEIGHT: 65 IN

## 2018-10-01 DIAGNOSIS — K50.019 CROHN'S DISEASE OF SMALL INTESTINE WITH COMPLICATION: Primary | ICD-10-CM

## 2018-10-01 PROCEDURE — 99215 OFFICE O/P EST HI 40 MIN: CPT | Performed by: INTERNAL MEDICINE

## 2018-10-01 RX ORDER — POLYETHYLENE GLYCOL 3350, SODIUM SULFATE, SODIUM CHLORIDE, POTASSIUM CHLORIDE, SODIUM ASCORBATE, AND ASCORBIC ACID 7.5-2.691G
2000 KIT ORAL ONCE
Qty: 2000 ML | Refills: 0 | Status: SHIPPED | OUTPATIENT
Start: 2018-10-01 | End: 2018-10-01

## 2018-10-01 NOTE — TELEPHONE ENCOUNTER
----- Message from Mili Calix sent at 10/1/2018  9:20 AM CDT -----  Contact: Patient  GI:  Patient has appt this afternoon and is really wanting to know what the chances are she can have procedure tomorrow.  She is nervous to wait for 3 weeks.  She can be reached at [phone].  Thank you  abc

## 2018-10-01 NOTE — TELEPHONE ENCOUNTER
Pt notified Dr. Flores is not available to tomorrow, procedures performed on Monday mornings and Thursdays.  Pt notified Dr. Flores will be out from October 4-17, 2018.  Pt rescheduled in clinic this afternoon, 10/1/18, at 230pm.  Pt acknowledged understanding.

## 2018-10-01 NOTE — PROGRESS NOTES
[facility]    CC: abdominal pain     HPI 53 y.o. female PMH of large hiatal hernia (repaired in 2018) w/ nissen fundoplication. Dermatomyositis, HLD, IBS, SVT,  Complex regional pain syndrome, small fiber sensory neuropathy,HTN, Psuedotumor cerebri w/  shunt (2018), IBS presents with a 10 month hx of progressive abdominal pain. The pain is located in the umbilical region. The pain is described as radiating pain, that radiates around the stomach with extensive to the back. The quality of the pain is stabbing like that has a severity of 10/10. Not associated with meals and not relieved by passing flatus or having a bowel movement. The patient has had unintended weight loss of 25lbs in the last 4 months. The patient notes decrease in appetite w/ increasing fatigue. Associated symptoms include Nausea w/o emesis, Bloating, w/o belching, increased amounts of flatus. The patient has started to have maroon/red colored stools that are intermittent since 2018.  The patients stool consistency as of the last 1-2 years is constant diarrhea. The patient has 5-6 watery/loose stools every day for the last 1-2 years.         Past Medical History:   Diagnosis Date    Acquired lymphedema     Chronic migraine     Complex regional pain syndrome     Hx of dermatomyositis     Hyperlipidemia     IBS (irritable bowel syndrome)     SVT (supraventricular tachycardia)          Past Surgical History:   Procedure Laterality Date     necrotizing fasciitis surgery  2007    APPENDECTOMY  2000    breast reduction      cervical fusion  2004     SECTION, LOW TRANSVERSE  ,     CSF SHUNT  2018    LIPOSUCTION  2007    TUBAL LIGATION     Hysterectomy 2014  Nissen fundoplication 2018    Social History  Social History     Tobacco Use    Smoking status: Former Smoker     Last attempt to quit: 10/11/1995     Years since quittin.9    Smokeless tobacco: Former User   Substance Use Topics     "Alcohol use: No    Drug use: No         Family History   Problem Relation Age of Onset    Cancer Mother Melanoma    Heart disease Father Leukemia        Review of Systems  General ROS: positive chills,negative fever, but has weight loss  Psychological ROS: negative for hallucination, depression or suicidal ideation  Ophthalmic ROS: positive for blurry vision, right eye pain   ENT ROS: negative for epistaxis, sore throat or rhinorrhea  Respiratory ROS: +shortness of breath, No wheezing  Cardiovascular ROS: no chest pain or dyspnea on exertion  Gastrointestinal ROS: mentioned in HPI  Genito-Urinary ROS: no dysuria, trouble voiding, or hematuria  Musculoskeletal ROS: negative for gait disturbance or muscular weakness  Neurological ROS: no syncope or seizures; no ataxia  Dermatological ROS: negative for pruritis, rash and jaundice    Physical Examination  /86   Pulse 92   Temp 97.6 °F (36.4 °C) (Oral)   Ht 5' 5" (1.651 m)   Wt 72.8 kg (160 lb 7.9 oz)   BMI 26.71 kg/m²   General appearance: alert, cooperative, no distress  HENT: Normocephalic, atraumatic, neck symmetrical, no nasal discharge   Eyes: conjunctivae/corneas clear, PERRL, EOM's intact  Lungs: clear to auscultation bilaterally, no dullness to percussion bilaterally  Heart: regular rate and rhythm without rub; no displacement of the PMI   Abdomen: soft,midline hernia palpated near umbilicus, patient tender to palpation on exam,bowel sounds normoactive; no organomegaly  Extremities: extremities symmetric; no clubbing, cyanosis, or edema  Integument: Skin color, texture, turgor normal; no rashes; hair distrubution normal  Neurologic: Alert and oriented X 3, normal strength, normal coordination and gait  Psychiatric: no pressured speech; normal affect; no evidence of impaired cognition     Labs:  Paper labs were brought to clinic and reviewed.    Imaging:  CT abd documentation reviewed: concern for diverticulosis  Concern for IBD  MRI cardiac " morphology: 11/5/2015: Large Hiatal hernia   MRI pelvis w/o contrast 5/29/14: Essentially unchanged appearance of known sacral Tarlov cyst.  While the majority of these lesions are asymptomatic, some symptoms related to nerve root compression cannot be entirely excluded.    Assessment:   53 y.o. female PMH of large hiatal hernia (repaired in 05/2018) w/ nissen fundoplication. Dermatomyositis, HLD, IBS, SVT,  Complex regional pain syndrome, small fiber sensory neuropathy,HTN, Psuedotumor cerebri w/  shunt (01/2018), IBS presents with a 10 month hx of progressive abdominal pain. The pain is located in the umbilical region. The pain is described as radiating pain, that radiates around the stomach with extensive to the back. The quality of the pain is stabbing like that has a severity of 10/10. Not associated with meals and not relieved by passing flatus or having a bowel movement. The patient has had unintended weight loss of 25lbs in the last 4 months. The patient notes decrease in appetite w/ increasing fatigue. Associated symptoms include Nausea w/o emesis, Bloating, w/o belching, increased amounts of flatus. The patient has started to have maroon/red colored stools that are intermittent since 01/2018.  The patients stool consistency as of the last 1-2 years is constant diarrhea. The patient has 5-6 watery/loose stools every day for the last 1-2 years.      Plan:   Video capsule endoscopy to evaluate for small bowel Crohn's disease due to history of chronic diarrhea >6 months in duration associated with abdominal pain  If video capsule endoscopy is negative, likely diagnoses include visceral hyperalgesia vs intra-abdominal adhesions. She also has a horacio umbilical hernia which is tender to palpation and indicates a component of MS pain  Elavil is the likely next step after VCE in this patient     Patient of Dr. Bucio   Conservative treatment if possible     John Flores MD   [address], [address]    [place] [address]   [phone]

## 2018-10-01 NOTE — PATIENT INSTRUCTIONS
"MOVIPREP Instructions    You are scheduled for a colonoscopy with Dr. Flores on Thursday, October 18, 2018 at 730am  To ensure that your test is accurate and complete, you MUST follow these instructions listed below.  If you have any questions, please call our office at [phone].  Plan on being at the hospital for your procedure for 3-4 hours.    1.  Follow a CLEAR LIQUID DIET for the entire day before your scheduled colonoscopy.  This means no solid food the entire day starting when you wake.  You may have as much of the clear liquids as you want throughout the day.   CLEAR LIQUID DIET:   - Avoid Red, Orange, Purple, and/or Blue food coloring   - NO DAIRY   - You can have:  Coffee with sugar (no creamer), tea, water, soda, apple or white grape juice, chicken or beef broth/bouillon (no meat, noodles, or veggies), green/yellow popsicles, green/yellow Jell-O, lemonade.    2.  AT 5 pm the evening before your colonoscopy, EMPTY ONE PACKET OF "A" AND ONE PACKET OF "B" (which is inside your Moviprep box) INTO THE CONTAINER PROVIDED INSIDE THE BOX.  ADD LUKEWARM WATER TO THE TOP LINE OF THE CONTAINER.  MIX WELL TO DISSOLVE, AND THEN DRINK THE MIXTURE OVER THE NEXT HOUR.  This is sometimes easier to drink if this solution is cold, so you can mix the solution a few hours ahead of time and place in the refrigerator prior to drinking.  You have to drink the solution within 24 hours of mixing it.  Do NOT put this solution over ice.  It IS ok to drink with a straw.    3.  The endoscopy department will call you 2 days before your colonoscopy to tell you the exact time to arrive, AND to tell you the exact time to drink the 2nd portion of your prep (which will be FIVE HOURS BEFORE YOUR ARRIVAL TIME).  At this time given to you, EMPTY ONE PACKET OF "A" AND ONE PACKET OF "B" (which is inside your Moviprep box) INTO THE CONTAINER PROVIDED INSIDE THE BOX.  ADD LUKEWARM WATER TO THE TOP LINE OF THE CONTAINER.  MIX WELL TO DISSOLVE, " AND THEN DRINK THE MIXTURE OVER THE NEXT HOUR.  This is sometimes easier to drink if this solution is cold, so you can mix the solution a few hours ahead of time and place in the refrigerator prior to drinking.  You have to drink the solution within 24 hours of mixing it.  Do NOT put this solution over ice.  It IS ok to drink with a straw. Once this is complete, you may not have ANYTHING else by mouth!    4.  You must have someone with you to DRIVE YOU HOME since you will be receiving IV sedation for the colonoscopy.    5.  It is ok to take your heart, blood pressure, and seizure medications in the morning of your test with a SIP of water.  Hold other medications until after your procedure.  Do NOT have anything else to eat or drink the morning of your colonoscopy.  It is ok to brush your teeth.    6.  If you are on blood thinners THAT YOU HAVE BEEN INSTRUCTED TO HOLD BY YOUR DOCTOR FOR THIS PROCEDURE, then do NOT take this the morning of your colonoscopy.  Do NOT stop these medications on your own, they must be approved to be held by your doctor.  Your colonoscopy can NOT be done if you are on these medications.  Examples of blood thinners include: Coumadin, Aggrenox, Plavix, Pradaxa, Reapro, Pletal, Xarelto, Ticagrelor, Brilinta, Eliquis, and high dose aspirin (325 mg).  You do not have to stop baby aspirin 81 mg.    7.  IF YOU ARE DIABETIC:  NO INSULIN OR ORAL MEDICATIONS THE MORNING OF THE COLONOSCOPY.  TAKE ONLY HALF THE DOSE OF YOUR INSULIN THE DAY BEFORE THE COLONOSCOPY.  DO NOT TAKE ANY ORAL DIABETIC MEDICATIONS THE DAY BEFORE THE COLONOSCOPY.  IF YOU ARE AN INSULIN DEPENDENT DIABETIC WITH UNSTABLE BLOOD SUGARDS, NOTIFY YOUR PRIMARY CARE PHYSICIAN FOR INSTRUCTIONS.

## 2018-10-02 ENCOUNTER — CLINICAL SUPPORT (OUTPATIENT)
Dept: REHABILITATION | Facility: HOSPITAL | Age: 53
End: 2018-10-02
Payer: COMMERCIAL

## 2018-10-02 DIAGNOSIS — R29.898 LEG WEAKNESS, BILATERAL: ICD-10-CM

## 2018-10-02 DIAGNOSIS — R26.9 IMPAIRED GAIT: Primary | ICD-10-CM

## 2018-10-02 DIAGNOSIS — M79.671 RIGHT FOOT PAIN: ICD-10-CM

## 2018-10-02 DIAGNOSIS — M25.673 DECREASED RANGE OF MOTION OF ANKLE, UNSPECIFIED LATERALITY: ICD-10-CM

## 2018-10-02 PROCEDURE — 97113 AQUATIC THERAPY/EXERCISES: CPT

## 2018-10-02 NOTE — PROGRESS NOTES
Physical Therapy Aquatic Treatment Note     Name: Hattie Villalpando  Clinic Number: [id number]    Therapy Diagnosis:   No diagnosis found.  Physician: Gena Luz MD  Visit Date: 10/2/2018  Physician Orders: PT Eval and Treat   Medical Diagnosis:   M79.671 (ICD-10-CM) - Right foot pain   R20.0 (ICD-10-CM) - Numbness of right foot      Evaluation Date: 8/7/2018  Authorization Period Expiration: 12/31/2018  Plan of Care Certification Period:   Visit # / Visits authorized: 11/20     Time In: 1500  Time Out: 1600  Total Billable Time: 60 minutes     Precautions: CRPS (reflex sympathetic dystrophy)      Subjective     Pt reports: she reports that she continues to have pain, with a slight improvement in R hip pain intensity.    She has missed being in the pool over the last two weeks. She had a rash on her arms that were biopsied and dermatologist instructed her stay out of the pool until results came back. She is scheduled for her infusion Thursday which will take 5-7 days to recover from, then she will hopefully feel better and return to PT.    Pain: 8/10  Location: right foot, lower leg    Objective     Hattie received aquatic exercises to develop strength, endurance, ROM, flexibility, posture, core stabilization and gait for 60 minutes including.  Pt completed 60 min of continuous exercises to increase heart and respiratory rate to challenge endurance and respiratory muscles.     Warm-up Laps x 10 min   FWD,BWD,Side    Stretches 3 x 20 sec -   HSS with noodle on left  Hip adductor stretch with noodle on left  Quad/flank stretch with noodle on left.     ITB    LE exs 30x each  DB boxing x 2 min  DB depression with wall squat 10 x 10 sec  Walking backwards with increased speed and cues for ham/glut activation x 2 laps  Lateral lunges x 10  Bicycle on noodle 2 x 3 min  DKC in II bars  Marching in place  Hip abd/add, flex ext    Some exercises limited by weightbearing pain on R foot/leg    Truck control sitting on red board x  3 min  Pelvic rocking on red board x 3 min  Standing abdominal stretching with red board pushed out front x 5    Cool Down Laps 1 x each  FWD,BWD,Side      Home Exercises Provided and Patient Education Provided     Education provided:   - progress towards goals   - role of therapy     Written Home Exercises Provided: Patient was not issued HEP for pool.  Exercises were reviewed and Hattie was able to demonstrate them prior to the end of the session.   Pt received a written copy of exercises to perform at home.     Hattie demonstrated good  understanding of the education provided.     Assessment     Pt junior tx with ther ex well, good effort with all activities, but irritation to R lower leg eventually limited treatment.  She needed to sit x 5 min after getting out of pool to calm spasm in the right foot.     Pt will benefit from increasing endurance exercises to increase cardiac demands and strengthen intercoastals to assist in breathing.  Hattie is progressing well towards her goals.   Pt prognosis is Good.     Pt will continue to benefit from skilled outpatient physical therapy to address the deficits listed in the problem list box on initial evaluation, provide pt/family education and to maximize pt's level of independence in the home and community environment.     Pt's spiritual, cultural and educational needs considered and pt agreeable to plan of care and goals.    Anticipated barriers to physical therapy:  allodynia       Goals:   Short Term Goals: 4 weeks   1. Independent with HEP.  2. Report decreased LE < or =  4/10 with adls such as walking, dressing, and bathing  3. Increased AROM of L ankle to level of R ankle to promote proper gait mobility  Long Term Goals: 8 weeks   4. Report ability to ride bike again with pain </= 3/10 in R foot   5. Increased MMT for B hip and knee musculature by 1 muscle grade to maximize LOF    6. Pt will keep level of strength and ROM in bilateral LE's to maintain current  LOF  7. Patient to achieve level on the FOTO Outcomes Measurement System- CK = at least 40% but < 60% impaired, limited or restricted        Plan     Continue 2x per week. Outpatient Physical Therapy 2 times weekly for 8 weeks to include the following interventions: Aquatic Therapy, Electrical Stimulation IFC, Manual Therapy, Moist Heat/ Ice, Neuromuscular Re-ed, Patient Education, Self Care, Therapeutic Activites and Therapeutic Exercise.         Cheryl San, PT

## 2018-10-16 ENCOUNTER — TELEPHONE (OUTPATIENT)
Dept: ENDOSCOPY | Facility: HOSPITAL | Age: 53
End: 2018-10-16

## 2018-10-16 NOTE — TELEPHONE ENCOUNTER
Gave arrival time of 0730. Patient confirmed to take half of the dose of moviprep at 1700 10/17/18. Clear liquid diet reviewed.

## 2018-10-17 ENCOUNTER — TELEPHONE (OUTPATIENT)
Dept: NEUROLOGY | Facility: HOSPITAL | Age: 53
End: 2018-10-17

## 2018-10-17 RX ORDER — SODIUM CHLORIDE 9 MG/ML
INJECTION, SOLUTION INTRAVENOUS CONTINUOUS
Status: CANCELLED | OUTPATIENT
Start: 2018-10-17

## 2018-10-17 RX ORDER — SODIUM CHLORIDE 0.9 % (FLUSH) 0.9 %
3 SYRINGE (ML) INJECTION
Status: CANCELLED | OUTPATIENT
Start: 2018-10-17

## 2018-10-17 NOTE — TELEPHONE ENCOUNTER
----- Message from Sendy Woodward sent at 10/17/2018  9:59 AM CDT -----  Contact: self / [phone]  Patient is requesting a call back regarding, her prep for today. Please advise

## 2018-10-18 ENCOUNTER — HOSPITAL ENCOUNTER (OUTPATIENT)
Facility: HOSPITAL | Age: 53
Discharge: HOME | End: 2018-10-18
Attending: INTERNAL MEDICINE | Admitting: INTERNAL MEDICINE
Payer: COMMERCIAL

## 2018-10-18 DIAGNOSIS — R19.7 DIARRHEA, UNSPECIFIED TYPE: Primary | ICD-10-CM

## 2018-10-18 DIAGNOSIS — R19.7 DIARRHEA: ICD-10-CM

## 2018-10-18 DIAGNOSIS — K50.019 CROHN'S DISEASE OF SMALL INTESTINE WITH COMPLICATION: ICD-10-CM

## 2018-10-18 PROCEDURE — 91110 GI TRC IMG INTRAL ESOPH-ILE: CPT | Performed by: INTERNAL MEDICINE

## 2018-10-18 NOTE — H&P
[facility]    CC: abdominal pain      HPI 53 y.o. female PMH of large hiatal hernia (repaired in 05/2018) w/ nissen fundoplication. Dermatomyositis, HLD, IBS, SVT,  Complex regional pain syndrome, small fiber sensory neuropathy,HTN, Psuedotumor cerebri w/  shunt (01/2018), IBS presents with a 10 month hx of progressive abdominal pain. The pain is located in the umbilical region. The pain is described as radiating pain, that radiates around the stomach with extensive to the back. The quality of the pain is stabbing like that has a severity of 10/10. Not associated with meals and not relieved by passing flatus or having a bowel movement. The patient has had unintended weight loss of 25lbs in the last 4 months. The patient notes decrease in appetite w/ increasing fatigue. Associated symptoms include Nausea w/o emesis, Bloating, w/o belching, increased amounts of flatus. The patient has started to have maroon/red colored stools that are intermittent since 01/2018.  The patients stool consistency as of the last 1-2 years is constant diarrhea. The patient has 5-6 watery/loose stools every day for the last 1-2 years.      Past Medical History:   Diagnosis Date    Acquired lymphedema     Chronic migraine     Complex regional pain syndrome     Hx of dermatomyositis     Hyperlipidemia     IBS (irritable bowel syndrome)     SVT (supraventricular tachycardia)      Review of Systems  General ROS: negative for chills, fever or weight loss  Cardiovascular ROS: no chest pain or dyspnea on exertion  Gastrointestinal ROS: no abdominal pain, change in bowel habits, or black/ bloody stools    Physical Examination  General appearance: alert, cooperative, no distress  HENT: Normocephalic, atraumatic, neck symmetrical, no nasal discharge   Lungs: clear to auscultation bilaterally, no dullness to percussion bilaterally  Heart: regular rate and rhythm without rub; no displacement of the PMI   Abdomen: soft, non-tender;  bowel sounds normoactive; no organomegaly  Extremities: extremities symmetric; no clubbing, cyanosis, or edema  Neurologic: Alert and oriented X 3, normal strength, normal coordination and gait    Imaging:  CT abd documentation reviewed: concern for diverticulosis  Concern for IBD  MRI cardiac morphology: 11/5/2015: Large Hiatal hernia   MRI pelvis w/o contrast 5/29/14: Essentially unchanged appearance of known sacral Tarlov cyst.  While the majority of these lesions are asymptomatic, some symptoms related to nerve root compression cannot be entirely excluded.    Assessment:   53 y.o. female PMH of large hiatal hernia (repaired in 05/2018) w/ nissen fundoplication. Dermatomyositis, HLD, IBS, SVT,  Complex regional pain syndrome, small fiber sensory neuropathy,HTN, Psuedotumor cerebri w/  shunt (01/2018), IBS presents with a 10 month hx of progressive abdominal pain. The pain is located in the umbilical region. The pain is described as radiating pain, that radiates around the stomach with extensive to the back. The quality of the pain is stabbing like that has a severity of 10/10. Not associated with meals and not relieved by passing flatus or having a bowel movement. The patient has had unintended weight loss of 25lbs in the last 4 months. The patient notes decrease in appetite w/ increasing fatigue. Associated symptoms include Nausea w/o emesis, Bloating, w/o belching, increased amounts of flatus. The patient has started to have maroon/red colored stools that are intermittent since 01/2018.  The patients stool consistency as of the last 1-2 years is constant diarrhea. The patient has 5-6 watery/loose stools every day for the last 1-2 years.      Plan:   VCE to evaluate for small bowel Crohn's disease due to history of chronic diarrhea >6 months in duration associated with abdominal pain  If video capsule endoscopy is negative, likely diagnoses include visceral hyperalgesia vs intra-abdominal adhesions. She also  has a horacio umbilical hernia which is tender to palpation and indicates a component of MS pain  Elavil is the likely next step after VCE in this patient      Patient of Dr. Bucio   Conservative treatment if possible     John Flores MD   [address], [address]   [place] [address] [phone]

## 2018-10-18 NOTE — DISCHARGE INSTRUCTIONS
Pill Capsule Discharge Instructions:    You may drink colorless liquids starting 2 hours after swallowing the capsule. (10am)    You may have a light snack (such as a sandwich) starting 4 hours after swallowing the capsule. (12pm)    Check the blue flashing light frequently-be sure it is blinking- if it stops blinking or changes color, belia the time and call the Endoscopy Department at [phone].    Avoid Strong Magnetic fields such as MRI devices after swallowing the capsule until you pass it in a bowel movement. Note that the capsule is flushable and does not need to be retrieved after you pass the capsule.    Do not disconnect the equipment or completely remove the belt at any time during the procedure. Treat the data recorder carefully-avoid sudden movements and banging of the equipment.    Avoid direct exposure to bright sunlight.      Remove the belt and recorder in 8 hrs @ 4pm.      Return the belt and recorder to the 2nd floor of the hospital at the outpatient surgery desk @ 4pm.

## 2018-10-18 NOTE — OR NURSING
Patient swallowed pill cam without any difficulties. Discharge instructions reviewed with patient. Verbalized understanding. D/C ambulatory.

## 2018-10-22 ENCOUNTER — TELEPHONE (OUTPATIENT)
Dept: NEUROLOGY | Facility: HOSPITAL | Age: 53
End: 2018-10-22

## 2018-10-22 ENCOUNTER — DOCUMENTATION ONLY (OUTPATIENT)
Dept: GASTROENTEROLOGY | Facility: HOSPITAL | Age: 53
End: 2018-10-22

## 2018-10-22 NOTE — TELEPHONE ENCOUNTER
----- Message from Alysia Carlton sent at 10/22/2018 11:01 AM CDT -----  Contact: patient  GI- patient called for her tests results. #[phone]

## 2018-10-22 NOTE — TELEPHONE ENCOUNTER
----- Message from Alysia Carlton sent at 10/22/2018 11:01 AM CDT -----  Contact: patient  GI- patient called for her tests results. #[phone]

## 2018-10-22 NOTE — [employer] PATIENT INSTRUCTIONS
Discharge Summary/Instructions after an Endoscopic Procedure  Patient Name: Hattie Villalpando  Patient MRN: [id number]  Patient YOB: 1965     Thursday, October 18, 2018  John Flores MD  RESTRICTIONS:  During your procedure today, you received medications for sedation.  These   medications may affect your judgment, balance and coordination.  Therefore,   for 24 hours, you have the following restrictions:   - DO NOT drive a car, operate machinery, make legal/financial decisions,   sign important papers or drink alcohol.    ACTIVITY:  Today: no heavy lifting, straining or running due to procedural   sedation/anesthesia.  The following day: return to full activity including work.  DIET:  Eat and drink normally unless instructed otherwise.     TREATMENT FOR COMMON SIDE EFFECTS:  - Mild abdominal pain, nausea, belching, bloating or excessive gas:  rest,   eat lightly and use a heating pad.  - Sore Throat: treat with throat lozenges and/or gargle with warm salt   water.  - Because air was used during the procedure, expelling large amounts of air   from your rectum or belching is normal.  - If a bowel prep was taken, you may not have a bowel movement for 1-3 days.    This is normal.  SYMPTOMS TO WATCH FOR AND REPORT TO YOUR PHYSICIAN:  1. Abdominal pain or bloating, other than gas cramps.  2. Chest pain.  3. Back pain.  4. Signs of infection such as: chills or fever occurring within 24 hours   after the procedure.  5. Rectal bleeding, which would show as bright red, maroon, or black stools.   (A tablespoon of blood from the rectum is not serious, especially if   hemorrhoids are present.)  6. Vomiting.  7. Weakness or dizziness.  GO DIRECTLY TO THE NEAREST EMERGENCY ROOM IF YOU HAVE ANY OF THE FOLLOWING:      Difficulty breathing              Chills and/or fever over 101 F   Persistent vomiting and/or vomiting blood   Severe abdominal pain   Severe chest pain   Black, tarry stools   Bleeding- more than one  tablespoon   Any other symptom or condition that you feel may need urgent attention  Your doctor recommends these additional instructions:  If any biopsies were taken, your doctors clinic will contact you in 1 to 2   weeks with any results.  Likely diagnoses include visceral hyperalgesia vs intra-abdominal adhesions.   She also has a horacio umbilical hernia which is tender to palpation and   indicates a component of MS pain potential related to a diagnosis of   cutaneous nerve entrapment syndrome.   Future management could include a trial of lidocaine injection for suspected   cutaneous nerve entrapment by pain management vs a trial of elavil   Condition stable   Resume previous diet   The signs and symptoms of potential delayed complications were discussed   with the patient. If signs or symptoms of these complications develop, call   the [facility] at 1 [phone].   Return to normal activities tomorrow.  Written discharge instructions were   provided to the patient.  For questions, problems or results please call your physician - John Flores MD at Work:  [phone].  EMERGENCY PHONE NUMBER: [phone],  LAB RESULTS: [phone]  IF A COMPLICATION OR EMERGENCY SITUATION ARISES AND YOU ARE UNABLE TO REACH   YOUR PHYSICIAN - GO DIRECTLY TO THE EMERGENCY ROOM.  MD John Guerrero MD  10/22/2018 11:36:36 AM  This report has been verified and signed electronically.  [employer]

## 2020-08-18 ENCOUNTER — OFFICE VISIT (OUTPATIENT)
Dept: URGENT CARE | Facility: CLINIC | Age: 55
End: 2020-08-18
Payer: COMMERCIAL

## 2020-08-18 VITALS
DIASTOLIC BLOOD PRESSURE: 92 MMHG | RESPIRATION RATE: 17 BRPM | SYSTOLIC BLOOD PRESSURE: 125 MMHG | HEART RATE: 87 BPM | TEMPERATURE: 100 F | HEIGHT: 65 IN | OXYGEN SATURATION: 97 % | BODY MASS INDEX: 28.66 KG/M2 | WEIGHT: 172 LBS
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
## 2020-08-18 PROCEDURE — 99203 OFFICE O/P NEW LOW 30 MIN: CPT | Mod: S$GLB,,, | Performed by: NURSE PRACTITIONER

## 2020-08-18 PROCEDURE — 99203 PR OFFICE/OUTPT VISIT, NEW, LEVL III, 30-44 MIN: ICD-10-PCS | Mod: S$GLB,,, | Performed by: NURSE PRACTITIONER

## 2020-08-18 PROCEDURE — 73130 X-RAY EXAM OF HAND: CPT | Mod: FY,LT,S$GLB, | Performed by: RADIOLOGY

## 2020-08-18 PROCEDURE — 73130 XR HAND COMPLETE 3 VIEW LEFT: ICD-10-PCS | Mod: FY,LT,S$GLB, | Performed by: RADIOLOGY

## 2020-08-18 RX ORDER — AMOXICILLIN AND CLAVULANATE POTASSIUM 875; 125 MG/1; MG/1
1 TABLET, FILM COATED ORAL 2 TIMES DAILY
Qty: 20 TABLET | Refills: 0 | Status: SHIPPED | OUTPATIENT
Start: 2020-08-18 | End: 2020-08-28

## 2020-08-18 RX ORDER — MUPIROCIN 20 MG/G
OINTMENT TOPICAL
Qty: 22 G | Refills: 1 | Status: SHIPPED | OUTPATIENT
Start: 2020-08-18

## 2020-08-18 NOTE — PROGRESS NOTES
"Subjective:       Patient ID: Hattie Villalpando is a 54 y.o. female.    Vitals:  height is 5' 5" (1.651 m) and weight is 78 kg (172 lb). Her temporal temperature is 99.8 °F (37.7 °C). Her blood pressure is 125/92 (abnormal) and her pulse is 87. Her respiration is 17 and oxygen saturation is 97%.     Chief Complaint: Animal Bite    Pt was bitten by her 2 large dogs last night.  She has cleaned the wound last night.  She unsure of her tetanus status but she wants to clear this with her dermatologist first due to her immune status.  Pt is on blood thinners.  She was able to control the bleeding.  Pt reports pain and swelling to left hand, right hand and both upper legs.      Animal Bite   The incident occurred yesterday. The incident occurred at home. She came to the ER via personal transport. There is an injury to the right wrist, left wrist, right hand, left hand and right forearm. There is an injury to the left thigh and left lower leg. The pain is moderate. It is unlikely that a foreign body is present. Pertinent negatives include no abdominal pain, no light-headedness, no loss of consciousness, no weakness and no cough. She is right-handed. Her tetanus status is unknown.       Constitution: Negative for chills, fatigue and fever.   HENT: Negative for facial swelling, facial trauma and sore throat.    Neck: Negative for neck stiffness and painful lymph nodes.   Cardiovascular: Negative for chest trauma.   Eyes: Negative for eye trauma, eye itching, double vision, blurred vision and eyelid swelling.   Respiratory: Negative for cough.    Gastrointestinal: Negative for abdominal trauma, abdominal pain and rectal bleeding.   Genitourinary: Negative for hematuria, missed menses, genital trauma and pelvic pain.   Musculoskeletal: Negative for pain, trauma, joint pain, joint swelling and abnormal ROM of joint.   Skin: Positive for erythema. Negative for color change, pale, rash, wound, abrasion, laceration, lesion, skin " thickening/induration, puncture wound, bruising, abscess, avulsion and hives.   Allergic/Immunologic: Negative for environmental allergies, immunocompromised state and hives.   Neurological: Negative for dizziness, history of vertigo, light-headedness, coordination disturbances, altered mental status and loss of consciousness.   Hematologic/Lymphatic: Negative for swollen lymph nodes and history of bleeding disorder.   Psychiatric/Behavioral: Negative for altered mental status.       Objective:      Physical Exam   Constitutional: She is oriented to person, place, and time.   HENT:   Head: Normocephalic and atraumatic.   Cardiovascular: Normal rate.   Pulmonary/Chest: Effort normal. No respiratory distress.   Abdominal: Normal appearance.   Neurological: She is alert and oriented to person, place, and time.   Skin: Skin is warm and dry. Abrasions - upper ext.:  Forearm (right), forearm (left), wrist (right), hand (left), wrist (left) and hand (right)  Abrasions - lower ext.:  Upper leg (left) and upper leg (right)Lesions:  erythema     Psychiatric: Her behavior is normal. Mood normal.                       Assessment:       1. Dog bite, initial encounter        Plan:       Wounds cleaned with Hibiclens and saline.  Antibiotics.  Pt will ask dermatologist about TDap.  X ray of left hand.  Wounds   Left hand xray  No fracture or dislocation.  No bone destruction identified      Dog bite, initial encounter  -     amoxicillin-clavulanate 875-125mg (AUGMENTIN) 875-125 mg per tablet; Take 1 tablet by mouth 2 (two) times daily. for 10 days  Dispense: 20 tablet; Refill: 0  -     XR HAND COMPLETE 3 VIEW LEFT; Future; Expected date: 08/18/2020  -     mupirocin (BACTROBAN) 2 % ointment; Apply to affected area 3 times daily  Dispense: 22 g; Refill: 1         Patient Instructions     Dog Bite  A dog bite can cause a wound deep enough to break the skin. In such cases, the wound is cleaned and sometimes closed. If the wound  is closed, it is usually not completely closed. This is so that fluid can drain if the wound becomes infected. Often, wounds will be left open to heal. In addition to wound care, a tetanus shot may be given, if needed.    Home care  · Wash your hands well with soap and warm water before and after caring for the wound. This helps lower the risk of infection.  · Care for the wound as directed. If a dressing was applied to the wound, be sure to change it as directed.  · If the wound bleeds, place a clean, soft cloth on the wound. Then firmly apply pressure until the bleeding stops. This may take up to 5 minutes. Do not release the pressure and look at the wound during this time.  · Most wounds heal within 10 days. But an infection can occur even with proper treatment. So be sure to check the wound daily for signs of infection (see below).  · Antibiotics may be prescribed. These help prevent or treat infection. If youre given antibiotics, take them as directed. Also be sure to complete the medicines.  Rabies prevention  Rabies is a virus that can be carried in certain animals. These can include domestic animals such as dogs and cats. Pets fully vaccinated against rabies (2 shots) are at very low risk of infection. But because human rabies is almost always fatal, any biting pet should be confined for 10 days as an extra precaution. In general, if there is a risk for rabies, the following steps may need to be taken:  · If someones pet dog has bitten you, it should be kept in a secure area for the next 10 days to watch for signs of illness. (If the pet owner wont allow this, contact your local animal control center.) If the dog becomes ill or dies during that time, contact your local animal control center at once so the animal may be tested for rabies. If the dog stays healthy for the next 10 days, there is no danger of rabies in the animal or you.  ¨ If a stray dog bit you, contact your local animal control center.  They can give information on capture, quarantine, and animal rabies testing.  ¨ If you cant find the animal that bit you in the next 2 days, and if rabies exists in your area, you may need to receive the rabies vaccine series. Call your healthcare provider right away. Or, return to the emergency department promptly.  ¨ All animal bites should be reported to the local animal control center. If you were not given a form to fill out, you can report this yourself.  Follow-up care  Follow up with your healthcare provider, or as directed.  When to seek medical advice  Call your healthcare provider right away if any of these occur:  · Signs of infection:  ¨ Spreading redness or warmth from the wound  ¨ Increased pain or swelling  ¨ Fever of 100.4ºF (38ºC) or higher, or as directed by your healthcare provider  ¨ Colored fluid or pus draining from the wound  · Signs of rabies infection:  ¨ Headache  ¨ Confusion  ¨ Strange behavior  ¨ Increased salivating and drooling  ¨ Seizure  · Decreased ability to move any body part near the wound  · Bleeding that can't be stopped after 5 minutes of firm pressure  Date Last Reviewed: 3/1/2017  © 5381-1274 [employer]. [address] [place] [address], [place] [address]. All rights reserved. This information is not intended as a substitute for professional medical care. Always follow your healthcare professional's instructions.

## 2020-08-18 NOTE — PATIENT INSTRUCTIONS
Dog Bite  A dog bite can cause a wound deep enough to break the skin. In such cases, the wound is cleaned and sometimes closed. If the wound is closed, it is usually not completely closed. This is so that fluid can drain if the wound becomes infected. Often, wounds will be left open to heal. In addition to wound care, a tetanus shot may be given, if needed.    Home care  · Wash your hands well with soap and warm water before and after caring for the wound. This helps lower the risk of infection.  · Care for the wound as directed. If a dressing was applied to the wound, be sure to change it as directed.  · If the wound bleeds, place a clean, soft cloth on the wound. Then firmly apply pressure until the bleeding stops. This may take up to 5 minutes. Do not release the pressure and look at the wound during this time.  · Most wounds heal within 10 days. But an infection can occur even with proper treatment. So be sure to check the wound daily for signs of infection (see below).  · Antibiotics may be prescribed. These help prevent or treat infection. If youre given antibiotics, take them as directed. Also be sure to complete the medicines.  Rabies prevention  Rabies is a virus that can be carried in certain animals. These can include domestic animals such as dogs and cats. Pets fully vaccinated against rabies (2 shots) are at very low risk of infection. But because human rabies is almost always fatal, any biting pet should be confined for 10 days as an extra precaution. In general, if there is a risk for rabies, the following steps may need to be taken:  · If someones pet dog has bitten you, it should be kept in a secure area for the next 10 days to watch for signs of illness. (If the pet owner wont allow this, contact your local animal control center.) If the dog becomes ill or dies during that time, contact your local animal control center at once so the animal may be tested for rabies. If the dog stays healthy  for the next 10 days, there is no danger of rabies in the animal or you.  ¨ If a stray dog bit you, contact your local animal control center. They can give information on capture, quarantine, and animal rabies testing.  ¨ If you cant find the animal that bit you in the next 2 days, and if rabies exists in your area, you may need to receive the rabies vaccine series. Call your healthcare provider right away. Or, return to the emergency department promptly.  ¨ All animal bites should be reported to the local animal control center. If you were not given a form to fill out, you can report this yourself.  Follow-up care  Follow up with your healthcare provider, or as directed.  When to seek medical advice  Call your healthcare provider right away if any of these occur:  · Signs of infection:  ¨ Spreading redness or warmth from the wound  ¨ Increased pain or swelling  ¨ Fever of 100.4ºF (38ºC) or higher, or as directed by your healthcare provider  ¨ Colored fluid or pus draining from the wound  · Signs of rabies infection:  ¨ Headache  ¨ Confusion  ¨ Strange behavior  ¨ Increased salivating and drooling  ¨ Seizure  · Decreased ability to move any body part near the wound  · Bleeding that can't be stopped after 5 minutes of firm pressure  Date Last Reviewed: 3/1/2017  © 9203-0221 [employer]. [address] [place] [address], [place] [address]. All rights reserved. This information is not intended as a substitute for professional medical care. Always follow your healthcare professional's instructions.

## 2020-09-01 ENCOUNTER — OFFICE VISIT (OUTPATIENT)
Dept: URGENT CARE | Facility: CLINIC | Age: 55
End: 2020-09-01
Payer: COMMERCIAL

## 2020-09-01 VITALS
TEMPERATURE: 98 F | HEART RATE: 95 BPM | WEIGHT: 172 LBS | RESPIRATION RATE: 12 BRPM | DIASTOLIC BLOOD PRESSURE: 93 MMHG | BODY MASS INDEX: 28.66 KG/M2 | OXYGEN SATURATION: 99 % | SYSTOLIC BLOOD PRESSURE: 141 MMHG | HEIGHT: 65 IN

## 2020-09-01 DIAGNOSIS — L03.116 CELLULITIS OF LEFT LOWER EXTREMITY: ICD-10-CM

## 2020-09-01 DIAGNOSIS — S91.312D LACERATION OF LEFT FOOT, SUBSEQUENT ENCOUNTER: Primary | ICD-10-CM

## 2020-09-01 PROCEDURE — 99214 OFFICE O/P EST MOD 30 MIN: CPT | Mod: S$GLB,,, | Performed by: NURSE PRACTITIONER

## 2020-09-01 PROCEDURE — 99214 PR OFFICE/OUTPT VISIT, EST, LEVL IV, 30-39 MIN: ICD-10-PCS | Mod: S$GLB,,, | Performed by: NURSE PRACTITIONER

## 2020-09-01 RX ORDER — SULFAMETHOXAZOLE AND TRIMETHOPRIM 800; 160 MG/1; MG/1
1 TABLET ORAL 2 TIMES DAILY
Qty: 10 TABLET | Refills: 0 | Status: SHIPPED | OUTPATIENT
Start: 2020-09-01 | End: 2020-09-06

## 2020-09-01 NOTE — PROGRESS NOTES
"Subjective:       Patient ID: Hattie Villalpando is a 54 y.o. female.    Vitals:  height is 5' 5" (1.651 m) and weight is 78 kg (172 lb). Her skin temperature is 98 °F (36.7 °C). Her blood pressure is 141/93 (abnormal) and her pulse is 95. Her respiration is 12 and oxygen saturation is 99%.     Chief Complaint: Suture / Staple Removal (left foot )    Pt c/o left foot suture removal, pt c/o pain, swelling, drainage, and redness at the suture site  (9 days ago sutures were placed ).    Foot is tender, swollen and painful. Minimal erythema surrounding wound site and no purulence or fever. Pt on Augmentin until 4 days ago and swelling began shortly after. 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    Suture / Staple Removal  The sutures were placed 7 to 10 days ago. She tried antibiotic ointment use and oral antibiotics since the wound repair. The treatment provided mild relief. Her temperature was unmeasured prior to arrival. There has been bloody discharge from the wound. The redness has worsened. The swelling has worsened. The pain has worsened. There is difficulty moving the extremity or digit due to pain.       Constitution: Negative for chills, fatigue and fever.   HENT: Negative for facial swelling, congestion and sore throat.    Neck: Negative for painful lymph nodes.   Cardiovascular: Negative for chest pain and leg swelling.   Eyes: Negative for eye itching, double vision, blurred vision and eyelid swelling.   Respiratory: Negative for cough and shortness of breath.    Gastrointestinal: Negative for nausea, vomiting and diarrhea.   Genitourinary: Negative for dysuria, frequency, urgency and history of kidney stones.   Musculoskeletal: Negative for joint pain, joint swelling, muscle cramps and muscle ache.   Skin: Positive for color change and wound. Negative for pale, rash, abrasion, laceration, lesion, skin thickening/induration, puncture wound, erythema, bruising, abscess, avulsion and hives. "   Allergic/Immunologic: Negative for environmental allergies, seasonal allergies, immunocompromised state and hives.   Neurological: Negative for dizziness, history of vertigo, light-headedness, passing out and headaches.   Hematologic/Lymphatic: Negative for swollen lymph nodes.   Psychiatric/Behavioral: Negative for nervous/anxious, sleep disturbance and depression. The patient is not nervous/anxious.        Objective:      Physical Exam   Constitutional: She is oriented to person, place, and time. She appears well-developed.   HENT:   Head: Normocephalic and atraumatic. Head is without abrasion, without contusion and without laceration.   Ears:   Right Ear: External ear normal.   Left Ear: External ear normal.   Nose: Nose normal.   Mouth/Throat: Oropharynx is clear and moist and mucous membranes are normal.   Eyes: Pupils are equal, round, and reactive to light. Conjunctivae, EOM and lids are normal.   Neck: Trachea normal, full passive range of motion without pain and phonation normal. Neck supple.   Cardiovascular: Normal rate, regular rhythm and normal heart sounds.   Pulmonary/Chest: Effort normal and breath sounds normal. No stridor. No respiratory distress.   Musculoskeletal: Normal range of motion.        Feet:    Neurological: She is alert and oriented to person, place, and time.   Skin: Skin is warm, dry, intact and no rash. Capillary refill takes less than 2 seconds. abrasion, burn, bruising, erythema and ecchymosisPsychiatric: Her speech is normal and behavior is normal. Judgment and thought content normal.   Nursing note and vitals reviewed.        Assessment:       1. Laceration of left foot, subsequent encounter    2. Cellulitis of left lower extremity        Plan:         Laceration of left foot, subsequent encounter    Cellulitis of left lower extremity  -     sulfamethoxazole-trimethoprim 800-160mg (BACTRIM DS) 800-160 mg Tab; Take 1 tablet by mouth 2 (two) times daily. for 5 days  Dispense: 10  tablet; Refill: 0      Patient Instructions     Cellulitis  Cellulitis is an infection of the deep layers of skin. A break in the skin, such as a cut or scratch, can let bacteria under the skin. If the bacteria get to deep layers of the skin, it can be serious. If not treated, cellulitis can get into the bloodstream and lymph nodes. The infection can then spread throughout the body. This causes serious illness.  Cellulitis causes the affected skin to become red, swollen, warm, and sore. The reddened areas have a visible border. An open sore may leak fluid (pus). You may have a fever, chills, and pain.  Cellulitis is treated with antibiotics taken for 5 to 10 days. An open sore may be cleaned and covered with cool wet gauze. Symptoms should get better 1 to 2 days after treatment is started. Make sure to take all the antibiotics for the full number of days until they are gone. Keep taking the medicine even if your symptoms go away.  Home care  Follow these tips:  · Limit the use of the part of your body with cellulitis.   · If the infection is on your leg, keep your leg raised while sitting. This will help to reduce swelling.  · Take all of the antibiotic medicine exactly as directed until it is gone. Do not miss any doses, especially during the first 7 days. Dont stop taking the medicine when your symptoms get better.  · Keep the affected area clean and dry.  · Wash your hands with soap and warm water before and after touching your skin. Anyone else who touches your skin should also wash his or her hands. Don't share towels.  Follow-up care  Follow up with your healthcare provider, or as advised. If your infection does not go away on the first antibiotic, your healthcare provider will prescribe a different one.  When to seek medical advice  Call your healthcare provider right away if any of these occur:  · Red areas that spread  · Swelling or pain that gets worse  · Fluid leaking from the skin (pus)  · Fever higher  of 100.4º F (38.0º C) or higher after 2 days on antibiotics  Date Last Reviewed: 9/1/2016  © 5132-4401 [employer]. [address] [place] [address], [place] [address]. All rights reserved. This information is not intended as a substitute for professional medical care. Always follow your healthcare professional's instructions.

## 2020-09-02 NOTE — PATIENT INSTRUCTIONS
Cellulitis  Cellulitis is an infection of the deep layers of skin. A break in the skin, such as a cut or scratch, can let bacteria under the skin. If the bacteria get to deep layers of the skin, it can be serious. If not treated, cellulitis can get into the bloodstream and lymph nodes. The infection can then spread throughout the body. This causes serious illness.  Cellulitis causes the affected skin to become red, swollen, warm, and sore. The reddened areas have a visible border. An open sore may leak fluid (pus). You may have a fever, chills, and pain.  Cellulitis is treated with antibiotics taken for 5 to 10 days. An open sore may be cleaned and covered with cool wet gauze. Symptoms should get better 1 to 2 days after treatment is started. Make sure to take all the antibiotics for the full number of days until they are gone. Keep taking the medicine even if your symptoms go away.  Home care  Follow these tips:  · Limit the use of the part of your body with cellulitis.   · If the infection is on your leg, keep your leg raised while sitting. This will help to reduce swelling.  · Take all of the antibiotic medicine exactly as directed until it is gone. Do not miss any doses, especially during the first 7 days. Dont stop taking the medicine when your symptoms get better.  · Keep the affected area clean and dry.  · Wash your hands with soap and warm water before and after touching your skin. Anyone else who touches your skin should also wash his or her hands. Don't share towels.  Follow-up care  Follow up with your healthcare provider, or as advised. If your infection does not go away on the first antibiotic, your healthcare provider will prescribe a different one.  When to seek medical advice  Call your healthcare provider right away if any of these occur:  · Red areas that spread  · Swelling or pain that gets worse  · Fluid leaking from the skin (pus)  · Fever higher of 100.4º F (38.0º C) or higher after 2 days  on antibiotics  Date Last Reviewed: 9/1/2016  © 7969-2805 [employer]. [address] [place] [address], [place] [address]. All rights reserved. This information is not intended as a substitute for professional medical care. Always follow your healthcare professional's instructions.

## 2020-09-22 ENCOUNTER — TELEPHONE (OUTPATIENT)
Dept: ORTHOPEDICS | Facility: CLINIC | Age: 55
End: 2020-09-22

## 2020-09-22 ENCOUNTER — OFFICE VISIT (OUTPATIENT)
Dept: ORTHOPEDICS | Facility: CLINIC | Age: 55
End: 2020-09-22
Payer: COMMERCIAL

## 2020-09-22 VITALS — WEIGHT: 171.94 LBS | BODY MASS INDEX: 28.65 KG/M2 | HEIGHT: 65 IN

## 2020-09-22 DIAGNOSIS — M16.11 PRIMARY OSTEOARTHRITIS OF RIGHT HIP: ICD-10-CM

## 2020-09-22 DIAGNOSIS — M25.552 BILATERAL HIP PAIN: Primary | ICD-10-CM

## 2020-09-22 DIAGNOSIS — S73.191A TEAR OF RIGHT ACETABULAR LABRUM, INITIAL ENCOUNTER: Primary | ICD-10-CM

## 2020-09-22 DIAGNOSIS — M25.551 BILATERAL HIP PAIN: Primary | ICD-10-CM

## 2020-09-22 PROCEDURE — 99203 PR OFFICE/OUTPT VISIT, NEW, LEVL III, 30-44 MIN: ICD-10-PCS | Mod: S$GLB,,, | Performed by: ORTHOPAEDIC SURGERY

## 2020-09-22 PROCEDURE — 3008F PR BODY MASS INDEX (BMI) DOCUMENTED: ICD-10-PCS | Mod: CPTII,S$GLB,, | Performed by: ORTHOPAEDIC SURGERY

## 2020-09-22 PROCEDURE — 99999 PR PBB SHADOW E&M-EST. PATIENT-LVL IV: ICD-10-PCS | Mod: PBBFAC,,, | Performed by: ORTHOPAEDIC SURGERY

## 2020-09-22 PROCEDURE — 3008F BODY MASS INDEX DOCD: CPT | Mod: CPTII,S$GLB,, | Performed by: ORTHOPAEDIC SURGERY

## 2020-09-22 PROCEDURE — 99999 PR PBB SHADOW E&M-EST. PATIENT-LVL IV: CPT | Mod: PBBFAC,,, | Performed by: ORTHOPAEDIC SURGERY

## 2020-09-22 PROCEDURE — 99203 OFFICE O/P NEW LOW 30 MIN: CPT | Mod: S$GLB,,, | Performed by: ORTHOPAEDIC SURGERY

## 2020-09-23 ENCOUNTER — OFFICE VISIT (OUTPATIENT)
Dept: SPORTS MEDICINE | Facility: CLINIC | Age: 55
End: 2020-09-23
Payer: COMMERCIAL

## 2020-09-23 ENCOUNTER — TELEPHONE (OUTPATIENT)
Dept: SPORTS MEDICINE | Facility: CLINIC | Age: 55
End: 2020-09-23

## 2020-09-23 ENCOUNTER — HOSPITAL ENCOUNTER (OUTPATIENT)
Dept: RADIOLOGY | Facility: HOSPITAL | Age: 55
Discharge: HOME | End: 2020-09-23
Attending: STUDENT IN AN ORGANIZED HEALTH CARE EDUCATION/TRAINING PROGRAM
Payer: COMMERCIAL

## 2020-09-23 VITALS — WEIGHT: 171 LBS | HEIGHT: 65 IN | BODY MASS INDEX: 28.49 KG/M2

## 2020-09-23 DIAGNOSIS — S73.191A TEAR OF RIGHT ACETABULAR LABRUM, INITIAL ENCOUNTER: ICD-10-CM

## 2020-09-23 DIAGNOSIS — M25.551 RIGHT HIP PAIN: Primary | ICD-10-CM

## 2020-09-23 PROCEDURE — 99204 PR OFFICE/OUTPT VISIT, NEW, LEVL IV, 45-59 MIN: ICD-10-PCS | Mod: S$GLB,,, | Performed by: ORTHOPAEDIC SURGERY

## 2020-09-23 PROCEDURE — 3008F BODY MASS INDEX DOCD: CPT | Mod: CPTII,S$GLB,, | Performed by: ORTHOPAEDIC SURGERY

## 2020-09-23 PROCEDURE — 73502 X-RAY EXAM HIP UNI 2-3 VIEWS: CPT | Mod: 26,RT,, | Performed by: RADIOLOGY

## 2020-09-23 PROCEDURE — 99999 PR PBB SHADOW E&M-EST. PATIENT-LVL V: ICD-10-PCS | Mod: PBBFAC,,, | Performed by: ORTHOPAEDIC SURGERY

## 2020-09-23 PROCEDURE — 99999 PR PBB SHADOW E&M-EST. PATIENT-LVL V: CPT | Mod: PBBFAC,,, | Performed by: ORTHOPAEDIC SURGERY

## 2020-09-23 PROCEDURE — 3008F PR BODY MASS INDEX (BMI) DOCUMENTED: ICD-10-PCS | Mod: CPTII,S$GLB,, | Performed by: ORTHOPAEDIC SURGERY

## 2020-09-23 PROCEDURE — 73502 X-RAY EXAM HIP UNI 2-3 VIEWS: CPT | Mod: TC,RT

## 2020-09-23 PROCEDURE — 99204 OFFICE O/P NEW MOD 45 MIN: CPT | Mod: S$GLB,,, | Performed by: ORTHOPAEDIC SURGERY

## 2020-09-23 PROCEDURE — 73502 XR HIP 2 VIEW RIGHT: ICD-10-PCS | Mod: 26,RT,, | Performed by: RADIOLOGY

## 2020-09-23 NOTE — PROGRESS NOTES
"Subjective:          Chief Complaint: Hattie Villalpando is a 55 y.o. female who had concerns including Pain of the Right Hip.    HPI     Hattie Avalos pain has always had some pain in her right hip but pain started to worsen about 1 year ago. Pain is located over (points to) anterolateral thigh and groin. She reports that the pain is a 7 /10 stabbing pain today and not responding adequately to conservative measures which have included activity modifications, rest, and oral medication. Is affecting ADLs and limiting desired level of activity. Denies numbness, tingling, radiation, and inability to bear weight. She says she has fallen several times due to the hip "giving out". Says after her last fall, she felt a "pop" in her right groin.  Pain is 10 /10 at its worst. She sometimes uses a walker to ambulate.    Mechanical symptoms:  Subjective instability: (--) . Says her leg is "weak"  Worse with sleeping & walking.  Better with rest.   Nocturnal symptoms: (+)    No previous surgeries or trauma on the right hip.    Patient has a history of a  shunt, IBS, & dermatomyositis. She is not on steroids as this caused increased ICP, resulting in her shunt.    She has taken NSAIDs in the past but cannot take them now due to taking Eliquis (DVTs in the UEs from PICC lines). She tells me her father had a history of DVTs as well and [facility] is currently working her up for clotting disorders.  She has had several steroid injections into the right hip that helped for 1-2 days but did not help after. Her last injection was in July.  She has done formal therapy for her right hip as well but did not get relief.    She has been to several other physicians for different opinions regarding her right hip.    History of CRPS. She also has a history of a herniated disc at the right L2-L3 nerve root.  She has had steroid injections into her spine as well, which helped.      Review of Systems   Constitution: Negative.   HENT: Negative.  "   Eyes: Negative.    Cardiovascular: Negative.    Respiratory: Negative.    Endocrine: Negative.    Hematologic/Lymphatic: Negative.    Skin: Negative.    Musculoskeletal: Positive for joint pain.   Gastrointestinal: Negative.    Genitourinary: Negative.    Neurological: Negative.    Psychiatric/Behavioral: Negative.    Allergic/Immunologic: Negative.                    Objective:        General: Hattie is well-developed, well-nourished, appears stated age, in no acute distress, alert and oriented to time, place and person.     General    Vitals reviewed.  Constitutional: She is oriented to person, place, and time. She appears well-developed and well-nourished. No distress.   HENT:   Mouth/Throat: No oropharyngeal exudate.   Eyes: Right eye exhibits no discharge. Left eye exhibits no discharge.   Neck: Normal range of motion.   Cardiovascular: Normal rate.    Pulmonary/Chest: Effort normal and breath sounds normal. No respiratory distress.   Neurological: She is alert and oriented to person, place, and time. She has normal reflexes. No cranial nerve deficit. Coordination normal.   Psychiatric: She has a normal mood and affect. Her behavior is normal. Judgment and thought content normal.     General Musculoskeletal Exam   Gait: normal and antalgic   Pelvic Obliquity: none      Right Knee Exam     Inspection   Alignment:  normal  Effusion: absent    Left Knee Exam     Inspection   Alignment:  normal  Effusion: absent    Right Hip Exam     Inspection   Scars: absent  Swelling: absent  Bruising: absent  No deformity of hip.  Quadriceps Atrophy:  Negative  Erythema: absent    Range of Motion   Abduction:  30 normal   Adduction:  20 normal   Extension:  0 normal   Flexion:  100 normal   External rotation:  60 normal   Internal rotation:  15 normal     Tests   Pain w/ forced internal rotation (MARICHUY): absent  Pain w/ forced external rotation (FADIR): absent  Miky: negative  Trendelenburg Test: negative  Circumduction test:  negative  Stinchfield test: positive  Log Roll: negative  Snapping Hip (internal): negative  Step-down test: negative  Resisted sit-up pain: negative  Unresisted sit-up pain: negative  Resisted sit-up pain with adductor contraction pain: negative    Other   Sensation: normal    Comments:  Very tender along right lower leg when gripped during hip exam.  Left Hip Exam     Inspection   Scars: absent  Swelling: absent  No deformity of hip.  Quadriceps Atrophy:  negative  Erythema: absent  Bruising: absent    Range of Motion   Abduction:  45 normal   Adduction:  20 normal   Extension:  0 normal   Flexion:  120 normal   External rotation:  60 normal   Internal rotation: 30 normal     Tests   Pain w/ forced internal rotation (MARICHUY): absent  Pain w/ forced external rotation (FADIR): absent  Miky: negative  Trendelenburg Test: negative  Circumduction test: negative  Stinchfield test: negative  Log Roll: negative  Snapping Hip (internal): negative  Step-down test: negative  Resisted sit-up pain: negative  Resisted sit-up pain with adductor contraction pain: negative  Unresisted sit-up pain: negative    Other   Sensation: normal          Muscle Strength   Right Lower Extremity   Hip Abduction: 5/5   Hip Adduction: 5/5   Hip Flexion: 5/5   Left Lower Extremity   Hip Abduction: 5/5   Hip Adduction: 5/5   Hip Flexion: 5/5     Reflexes     Left Side  Achilles:  2+  Quadriceps:  2+    Right Side   Achilles:  2+  Quadriceps:  2+    Vascular Exam     Right Pulses  Dorsalis Pedis:      2+  Posterior Tibial:      2+        Left Pulses  Dorsalis Pedis:      2+  Posterior Tibial:      2+        Capillary Refill  Right Hand: normal capillary refill  Left Hand: normal capillary refill    Edema  Right Upper Leg: absent  Left Upper Leg: absent    XR Imaging (9-23-20):  Narrative & Impression     EXAMINATION:  XR HIP 2 VIEW RIGHT     TECHNIQUE:  Two views of the right hip were obtained, with an AP pelvis and a lateral projection of the right  hip submitted.     COMPARISON:  No relevant comparison examinations are currently available.     FINDINGS:  Hip joint spaces appear adequately maintained on both sides, without significant narrowing.  No conventional radiographic evidence to specifically suggest avascular necrosis of either femoral head.  Remaining osseous structures are unremarkable as well, with no evidence of recent or healing fracture or lytic destructive process observed.  SI joints appear unremarkable.  Distal aspect of a  shunt catheter is visualized, its tip in the left lower abdominal quadrant overlying the superior aspect of the left iliac wing.     Impression:     No significant abnormality referable to the right hip.         MRI Imaging (brought on disc: 3/19/20):    1. Right hip superior acetabular labral tear extends into the base of the anterior labrum and is associated with patchy focal areas of moderate femoroacetabular chondromalacia, particularly anterior.        Assessment:       Encounter Diagnoses   Name Primary?    Tear of right acetabular labrum, initial encounter     Right hip pain Yes          Plan:       1. Alcazar Hip Score was filled out today in clinic.     RTC prn with Dr. Mela Hopper. Patient will fill out Alcazar Hip Score on return.    2. Continue non-operative symptomatic treatment including activity modification, pain control, and possible nerve stimulator    3. Possible sympathetic block and/or steroid injections for pain control    4. Patient's pain is not all due to right hip arthritic pathology. Many more confounding factors that could cause pain in the right leg.    5. Do not currently recommend operative management                [facility] patient questionnaires have been collected today.

## 2020-09-23 NOTE — LETTER
September 23, 2020        Shashi Canales MD  [address]  [facility] [address]             [facility]  [address]  [facility] [address]  Phone: [phone]   Patient: Hattie Villalpando   MR Number: [id number]   YOB: 1965   Date of Visit: 9/23/2020       Dear Dr. Canales:    Thank you for referring Hattie Villalpando to me for evaluation. Below are the relevant portions of my assessment and plan of care.    Patient is not a good surgical candidate. I recommend moving forward with other modes of pain control including nerve stimulator and repeat steroid injections.       If you have questions, please do not hesitate to call me. I look forward to following Hattie along with you.    Sincerely,      Mela Hopper MD           CC  No Recipients

## 2020-09-23 NOTE — TELEPHONE ENCOUNTER
Spoke to the patient and scheduled a same day appt with Dr. Hopper after referral from Dr. Canales.    ----- Message from Ivania Razo sent at 9/22/2020  4:44 PM CDT -----  Regarding: FW: Second opinion    ----- Message -----  From: Cheryl Argueta MA  Sent: 9/22/2020   4:42 PM CDT  To: [facility] Staff  Subject: Second opinion                                   Dr. Canales would like this pt to see Dr. Hopper for a second opinion on her hip. The referral is in.

## 2020-09-23 NOTE — LETTER
September 23, 2020      Shashi Canales MD  [address]  [facility] [address]           [facility]  [address]  [facility] [address]  Phone: [phone]          Patient: Hattie Villalpando   MR Number: [id number]   YOB: 1965   Date of Visit: 9/23/2020       Dear Dr. Shashi Canales:    Thank you for referring Hattie Villalpando to me for evaluation. Attached you will find relevant portions of my assessment and plan of care.    If you have questions, please do not hesitate to call me. I look forward to following Hattie Villalpando along with you.    Sincerely,    Mela Hopper MD    Enclosure  CC:  No Recipients    If you would like to receive this communication electronically, please contact [email or web address] or [phone] to request more information on [email or web address] Link access.    For providers and/or their staff who would like to refer a patient to [facility], please contact us through our one-stop-shop provider referral line, [facility], at [phone].    If you feel you have received this communication in error or would no longer like to receive these types of communications, please e-mail [email or web address]

## 2020-09-24 PROBLEM — M16.11 PRIMARY OSTEOARTHRITIS OF RIGHT HIP: Status: ACTIVE | Noted: 2020-09-24

## 2020-09-24 NOTE — PROGRESS NOTES
Subjective:       Patient ID: Hattie Villalpando is a 55 y.o. female.    Chief Complaint:   Pain of the Right Hip   She comes in today for another opinion about her right hip, possible replacement surgery.  He has had move chronic right hip pain, then she does have CRPS the right lower extremity ever since she had a baby.  This is been for many years.  She is followed by pain management and she has had multiple treatments for this which have not been successful, including sympathetic blocks.  She had a fall in December of last year and has had a different more intense pain in the right groin since then.  She does have night pain interfering with sleep.  She has seen multiple previous physicians who recommended to her different things.  There is a doctor in [place] she is interested in replacing her hip, and another 1 into well as he said he would not because of her CRPS.  She does have an MRI which shows a labral tear, but also early arthrosis.  No one has offered her labral repair.  She got a cortisone injection into her hip which helped while the local was there, but after that she says it hurt worse.    Past Medical History:   Diagnosis Date    Acquired lymphedema     Chronic migraine     Complex regional pain syndrome     Hx of dermatomyositis     Hyperlipidemia     IBS (irritable bowel syndrome)     SVT (supraventricular tachycardia)      Past Surgical History:   Procedure Laterality Date     necrotizing fasciitis surgery      APPENDECTOMY      breast reduction  2007    cervical fusion       SECTION, LOW TRANSVERSE      CSF SHUNT      INTRALUMINAL GASTROINTESTINAL TRACT IMAGING VIA CAPSULE N/A 10/18/2018    Procedure: video capsule study at 7:30am;  Surgeon: John Flores MD;  Location: [facility];  Service: Endoscopy;  Laterality: N/A;    LIPOSUCTION      TUBAL LIGATION       Family History   Problem Relation Age of Onset    Cancer Mother     Heart disease Father      Social History      Socioeconomic History    Marital status:      Spouse name: Not on file    Number of children: Not on file    Years of education: Not on file    Highest education level: Not on file   Occupational History     Employer: [employer]   Social Needs    Financial resource strain: Not on file    Food insecurity     Worry: Not on file     Inability: Not on file    Transportation needs     Medical: Not on file     Non-medical: Not on file   Tobacco Use    Smoking status: Former Smoker     Packs/day: 1.00     Years: 10.00     Pack years: 10.00     Quit date: 10/11/1995     Years since quittin.9    Smokeless tobacco: Former User   Substance and Sexual Activity    Alcohol use: No    Drug use: No    Sexual activity: Not Currently   Lifestyle    Physical activity     Days per week: Not on file     Minutes per session: Not on file    Stress: Not on file   Relationships    Social connections     Talks on phone: Not on file     Gets together: Not on file     Attends [employer] service: Not on file     Active member of club or organization: Not on file     Attends meetings of clubs or organizations: Not on file     Relationship status: Not on file   Other Topics Concern    Not on file   Social History Narrative    Not on file       Current Outpatient Medications   Medication Sig Dispense Refill    acetaminophen (TYLENOL) 325 MG tablet Take 650 mg by mouth.      ALPRAZolam (XANAX) 0.5 MG tablet as needed.       aspirin (ECOTRIN) 325 MG EC tablet TAKE 2 TABLETS BY MOUTH TWICE A DAY FOR 15 DAYS      bacitracin (AK-TRACIN) ophthalmic ointment 1 application 2 (two) times daily. Apply to affected area for seven days  0    butalbital-aspirin-caffeine -40 mg (BUTALBITAL-ASPIRIN-CAFFEINE) -40 mg Cap Take 1 capsule by mouth every 6 (six) hours as needed.        CARAFATE 100 mg/mL suspension TAKE 2 TEASPOONFULS (10 MLS) BY MOUTH TWICE DAILY  5    carvedilol (COREG) 6.25 MG tablet        cyclobenzaprine (FLEXERIL) 5 MG tablet       diclofenac sodium 1 % Gel as needed.       diphenhydrAMINE (BENADRYL) 25 mg capsule Take 25 mg by mouth as needed.       fluocinolone (SYNALAR) 0.01 % external solution Apply to scalp as needed for itching      fluocinonide (LIDEX) 0.05 % external solution       hydrocodone-acetaminophen 7.5-325mg (NORCO) 7.5-325 mg per tablet Take 1 tablet by mouth every 4 (four) hours as needed. for pain.  0    ketorolac (TORADOL) 10 mg tablet Take 10 mg by mouth every 8 (eight) hours as needed.  0    MEDICAL SUPPLY, MISCELLANEOUS (MISCELLANEOUS MEDICAL SUPPLY [facility] Patient has an implanted device with an external monitor.      mupirocin (BACTROBAN) 2 % ointment Apply to affected area 3 times daily 22 g 1    mycophenolate (CELLCEPT) 500 mg Tab       promethazine (PHENERGAN) 25 MG suppository INSERT 1 RECTALLY EVERY 8 HOURS AS NEEDED FOR NAUSEA  1    promethazine (PHENERGAN) 25 MG tablet TAKE 1 TABLET BY MOUTH EVERY 4-6 HOURS AS NEEDED FOR NAUSEA.      pregabalin (LYRICA) 25 MG capsule Take 1 capsule (25 mg total) by mouth 2 (two) times daily. 60 capsule 6     No current facility-administered medications for this visit.      Review of patient's allergies indicates:   Allergen Reactions    Doxycycline Other (See Comments)    Iodine and iodide containing products Swelling    Esomeprazole magnesium      Other reaction(s): Gastritis    Iodinated contrast media      Other reaction(s): RASH, BP ELEVATION, PALPITATIONS.    Povidone-iodine      Other reaction(s): THROAT SWELLING, EYES SWOLLEN SHUT, HIVES, ITCHING    Scopolamine      Other reaction(s): massive migraine    Shellfish containing products      Other reaction(s): SWELLING,HIVES    Cephalexin Rash       ROS:  A review of systems is updated and there are no reported vision changes, ear/nose/mouth/throat complaints,  chest pain, shortness of breath, abdominal pain, urological complaints, fevers or chills, psychiatric  "complaints. Musculoskeletal and neurologcial symptoms are as documented. All other systems are negative.    Objective:      Vitals:    09/22/20 1554   Weight: 78 kg (171 lb 15.3 oz)   Height: 5' 5" (1.651 m)     Physical Exam  Right hip: Positive C sign, positive Stinchfield sign.  No tenderness at the greater trochanter or iliotibial band.  No tenderness at the SI joint.  The patient has pain in the groin with passive flexion and internal rotation of the hip which is limited.  Knee benign without tenderness or effusion, with normal range of motion.  Skin intact.  Distal neurovascular intact.  Flip test negative.    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  Plan:       She says she has never had any CRPS symptoms above the knee, so I think that right hip replacement could likely be accomplished safely.  However, she does have the labral tear and minimal cartilage loss on MRI.  She could be a candidate for labral repair.  We will have Dr. Hopper review her situation for his thoughts as well.    The patient's pathophysiology was explained in detail with reference to x-rays, models, other visual aids as appropriate.  Treatment options were discussed in detail.  Questions were invited and answered to the patient's satisfaction.    "

## 2021-04-15 ENCOUNTER — PATIENT MESSAGE (OUTPATIENT)
Dept: RESEARCH | Facility: HOSPITAL | Age: 56
End: 2021-04-15

## 2021-06-11 ENCOUNTER — OFFICE VISIT (OUTPATIENT)
Dept: URGENT CARE | Facility: CLINIC | Age: 56
End: 2021-06-11
Payer: COMMERCIAL

## 2021-06-11 VITALS
DIASTOLIC BLOOD PRESSURE: 86 MMHG | BODY MASS INDEX: 29.66 KG/M2 | WEIGHT: 178 LBS | HEART RATE: 86 BPM | HEIGHT: 65 IN | TEMPERATURE: 99 F | SYSTOLIC BLOOD PRESSURE: 128 MMHG | RESPIRATION RATE: 14 BRPM | OXYGEN SATURATION: 97 %

## 2021-06-11 DIAGNOSIS — T14.8XXA HEMATOMA: Primary | ICD-10-CM

## 2021-06-11 PROCEDURE — 99214 PR OFFICE/OUTPT VISIT, EST, LEVL IV, 30-39 MIN: ICD-10-PCS | Mod: S$GLB,,, | Performed by: NURSE PRACTITIONER

## 2021-06-11 PROCEDURE — 3008F BODY MASS INDEX DOCD: CPT | Mod: CPTII,S$GLB,, | Performed by: NURSE PRACTITIONER

## 2021-06-11 PROCEDURE — 3008F PR BODY MASS INDEX (BMI) DOCUMENTED: ICD-10-PCS | Mod: CPTII,S$GLB,, | Performed by: NURSE PRACTITIONER

## 2021-06-11 PROCEDURE — 99214 OFFICE O/P EST MOD 30 MIN: CPT | Mod: S$GLB,,, | Performed by: NURSE PRACTITIONER

## 2021-06-11 RX ORDER — APIXABAN 5 MG/1
5 TABLET, FILM COATED ORAL 2 TIMES DAILY
COMMUNITY
Start: 2021-03-14

## 2021-08-09 ENCOUNTER — TELEPHONE (OUTPATIENT)
Dept: GENETICS | Facility: CLINIC | Age: 56
End: 2021-08-09

## 2021-08-10 ENCOUNTER — OFFICE VISIT (OUTPATIENT)
Dept: GENETICS | Facility: CLINIC | Age: 56
End: 2021-08-10
Payer: MEDICARE

## 2021-08-10 VITALS — WEIGHT: 181.88 LBS | RESPIRATION RATE: 14 BRPM | BODY MASS INDEX: 29.23 KG/M2 | HEIGHT: 66 IN | HEART RATE: 72 BPM

## 2021-08-10 DIAGNOSIS — M24.9 HYPERMOBILITY OF JOINT: ICD-10-CM

## 2021-08-10 DIAGNOSIS — R26.9 IMPAIRED GAIT: ICD-10-CM

## 2021-08-10 DIAGNOSIS — R29.898 LEG WEAKNESS, BILATERAL: Primary | ICD-10-CM

## 2021-08-10 PROCEDURE — 99999 PR PBB SHADOW E&M-EST. PATIENT-LVL V: CPT | Mod: PBBFAC,,, | Performed by: MEDICAL GENETICS

## 2021-08-10 PROCEDURE — 99205 PR OFFICE/OUTPT VISIT, NEW, LEVL V, 60-74 MIN: ICD-10-PCS | Mod: S$PBB,,, | Performed by: MEDICAL GENETICS

## 2021-08-10 PROCEDURE — 99999 PR PBB SHADOW E&M-EST. PATIENT-LVL V: ICD-10-PCS | Mod: PBBFAC,,, | Performed by: MEDICAL GENETICS

## 2021-08-10 PROCEDURE — 99205 OFFICE O/P NEW HI 60 MIN: CPT | Mod: S$PBB,,, | Performed by: MEDICAL GENETICS

## 2021-08-10 PROCEDURE — 99215 OFFICE O/P EST HI 40 MIN: CPT | Mod: PBBFAC | Performed by: MEDICAL GENETICS

## 2021-08-10 PROCEDURE — 96040 PR GENETIC COUNSELING, EACH 30 MIN: CPT | Mod: ,,, | Performed by: GENETIC COUNSELOR, MS

## 2021-08-10 PROCEDURE — 96040 PR GENETIC COUNSELING, EACH 30 MIN: ICD-10-PCS | Mod: ,,, | Performed by: GENETIC COUNSELOR, MS

## 2021-08-11 ENCOUNTER — TELEPHONE (OUTPATIENT)
Dept: ADMINISTRATIVE | Facility: OTHER | Age: 56
End: 2021-08-11

## 2021-08-20 ENCOUNTER — OFFICE VISIT (OUTPATIENT)
Dept: URGENT CARE | Facility: CLINIC | Age: 56
End: 2021-08-20
Payer: MEDICARE

## 2021-08-20 VITALS
HEART RATE: 80 BPM | RESPIRATION RATE: 16 BRPM | DIASTOLIC BLOOD PRESSURE: 90 MMHG | OXYGEN SATURATION: 99 % | BODY MASS INDEX: 29.99 KG/M2 | HEIGHT: 65 IN | SYSTOLIC BLOOD PRESSURE: 131 MMHG | WEIGHT: 180 LBS | TEMPERATURE: 99 F

## 2021-08-20 DIAGNOSIS — J06.9 VIRAL URI WITH COUGH: Primary | ICD-10-CM

## 2021-08-20 DIAGNOSIS — Z20.822 EXPOSURE TO COVID-19 VIRUS: ICD-10-CM

## 2021-08-20 DIAGNOSIS — R05.9 COUGH: ICD-10-CM

## 2021-08-20 DIAGNOSIS — J02.9 SORE THROAT: ICD-10-CM

## 2021-08-20 LAB
CTP QC/QA: YES
SARS-COV-2 RDRP RESP QL NAA+PROBE: NEGATIVE

## 2021-08-20 PROCEDURE — U0003 INFECTIOUS AGENT DETECTION BY NUCLEIC ACID (DNA OR RNA); SEVERE ACUTE RESPIRATORY SYNDROME CORONAVIRUS 2 (SARS-COV-2) (CORONAVIRUS DISEASE [COVID-19]), AMPLIFIED PROBE TECHNIQUE, MAKING USE OF HIGH THROUGHPUT TECHNOLOGIES AS DESCRIBED BY CMS-2020-01-R: HCPCS | Performed by: NURSE PRACTITIONER

## 2021-08-20 PROCEDURE — U0002: ICD-10-PCS | Mod: QW,CR,S$GLB, | Performed by: NURSE PRACTITIONER

## 2021-08-20 PROCEDURE — U0005 INFEC AGEN DETEC AMPLI PROBE: HCPCS | Performed by: NURSE PRACTITIONER

## 2021-08-20 PROCEDURE — 99214 OFFICE O/P EST MOD 30 MIN: CPT | Mod: S$GLB,,, | Performed by: NURSE PRACTITIONER

## 2021-08-20 PROCEDURE — 99214 PR OFFICE/OUTPT VISIT, EST, LEVL IV, 30-39 MIN: ICD-10-PCS | Mod: S$GLB,,, | Performed by: NURSE PRACTITIONER

## 2021-08-20 PROCEDURE — U0002 COVID-19 LAB TEST NON-CDC: HCPCS | Mod: QW,CR,S$GLB, | Performed by: NURSE PRACTITIONER

## 2021-08-22 ENCOUNTER — OFFICE VISIT (OUTPATIENT)
Dept: URGENT CARE | Facility: CLINIC | Age: 56
End: 2021-08-22
Payer: MEDICARE

## 2021-08-22 VITALS
WEIGHT: 174 LBS | HEART RATE: 91 BPM | TEMPERATURE: 98 F | HEIGHT: 65 IN | OXYGEN SATURATION: 96 % | DIASTOLIC BLOOD PRESSURE: 86 MMHG | BODY MASS INDEX: 28.99 KG/M2 | RESPIRATION RATE: 14 BRPM | SYSTOLIC BLOOD PRESSURE: 117 MMHG

## 2021-08-22 DIAGNOSIS — R31.9 HEMATURIA, UNSPECIFIED TYPE: ICD-10-CM

## 2021-08-22 DIAGNOSIS — R51.9 INTRACTABLE HEADACHE, UNSPECIFIED CHRONICITY PATTERN, UNSPECIFIED HEADACHE TYPE: Primary | ICD-10-CM

## 2021-08-22 DIAGNOSIS — R06.02 SOB (SHORTNESS OF BREATH): ICD-10-CM

## 2021-08-22 DIAGNOSIS — R10.31 RIGHT LOWER QUADRANT ABDOMINAL PAIN: ICD-10-CM

## 2021-08-22 DIAGNOSIS — R07.9 CHEST PAIN, UNSPECIFIED TYPE: ICD-10-CM

## 2021-08-22 LAB
BILIRUB UR QL STRIP: POSITIVE
CTP QC/QA: YES
GLUCOSE UR QL STRIP: NEGATIVE
KETONES UR QL STRIP: NEGATIVE
LEUKOCYTE ESTERASE UR QL STRIP: NEGATIVE
PH, POC UA: 5 (ref 5–8)
POC BLOOD, URINE: POSITIVE
POC NITRATES, URINE: NEGATIVE
PROT UR QL STRIP: NEGATIVE
SARS-COV-2 RDRP RESP QL NAA+PROBE: NEGATIVE
SARS-COV-2 RNA RESP QL NAA+PROBE: NOT DETECTED
SARS-COV-2- CYCLE NUMBER: -1
SP GR UR STRIP: 1.02 (ref 1–1.03)
UROBILINOGEN UR STRIP-ACNC: ABNORMAL (ref 0.1–1.1)

## 2021-08-22 PROCEDURE — 71046 XR CHEST PA AND LATERAL: ICD-10-PCS | Mod: FY,S$GLB,, | Performed by: RADIOLOGY

## 2021-08-22 PROCEDURE — 81003 POCT URINALYSIS, DIPSTICK, AUTOMATED, W/O SCOPE: ICD-10-PCS | Mod: QW,S$GLB,, | Performed by: NURSE PRACTITIONER

## 2021-08-22 PROCEDURE — 71046 X-RAY EXAM CHEST 2 VIEWS: CPT | Mod: FY,S$GLB,, | Performed by: RADIOLOGY

## 2021-08-22 PROCEDURE — U0002: ICD-10-PCS | Mod: QW,CR,S$GLB, | Performed by: NURSE PRACTITIONER

## 2021-08-22 PROCEDURE — 99214 OFFICE O/P EST MOD 30 MIN: CPT | Mod: 25,S$GLB,CS, | Performed by: NURSE PRACTITIONER

## 2021-08-22 PROCEDURE — 93005 ELECTROCARDIOGRAM TRACING: CPT | Mod: S$GLB,,, | Performed by: NURSE PRACTITIONER

## 2021-08-22 PROCEDURE — U0002 COVID-19 LAB TEST NON-CDC: HCPCS | Mod: QW,CR,S$GLB, | Performed by: NURSE PRACTITIONER

## 2021-08-22 PROCEDURE — 93010 EKG 12-LEAD: ICD-10-PCS | Mod: S$GLB,,, | Performed by: INTERNAL MEDICINE

## 2021-08-22 PROCEDURE — 93005 EKG 12-LEAD: ICD-10-PCS | Mod: S$GLB,,, | Performed by: NURSE PRACTITIONER

## 2021-08-22 PROCEDURE — 99214 PR OFFICE/OUTPT VISIT, EST, LEVL IV, 30-39 MIN: ICD-10-PCS | Mod: 25,S$GLB,CS, | Performed by: NURSE PRACTITIONER

## 2021-08-22 PROCEDURE — 93010 ELECTROCARDIOGRAM REPORT: CPT | Mod: S$GLB,,, | Performed by: INTERNAL MEDICINE

## 2021-08-22 PROCEDURE — 81003 URINALYSIS AUTO W/O SCOPE: CPT | Mod: QW,S$GLB,, | Performed by: NURSE PRACTITIONER

## 2021-08-22 RX ORDER — PROMETHAZINE HYDROCHLORIDE 25 MG/1
25 TABLET ORAL
COMMUNITY

## 2021-09-07 ENCOUNTER — TELEPHONE (OUTPATIENT)
Dept: ADMINISTRATIVE | Facility: OTHER | Age: 56
End: 2021-09-07

## 2022-05-02 ENCOUNTER — APPOINTMENT (OUTPATIENT)
Dept: GENERAL RADIOLOGY | Age: 57
End: 2022-05-02
Attending: EMERGENCY MEDICINE
Payer: COMMERCIAL

## 2022-05-02 ENCOUNTER — HOSPITAL ENCOUNTER (OUTPATIENT)
Age: 57
Discharge: HOME | End: 2022-05-02
Attending: EMERGENCY MEDICINE
Payer: COMMERCIAL

## 2022-05-02 VITALS
RESPIRATION RATE: 18 BRPM | SYSTOLIC BLOOD PRESSURE: 137 MMHG | DIASTOLIC BLOOD PRESSURE: 82 MMHG | TEMPERATURE: 98 F | OXYGEN SATURATION: 99 % | BODY MASS INDEX: 28.32 KG/M2 | HEIGHT: 65 IN | HEART RATE: 83 BPM | WEIGHT: 170 LBS

## 2022-05-02 DIAGNOSIS — U07.1 COVID-19: Primary | ICD-10-CM

## 2022-05-02 LAB
BILIRUB UR QL STRIP: NEGATIVE
CLARITY UR: CLEAR
COLOR UR: YELLOW
GLUCOSE UR STRIP-MCNC: NEGATIVE MG/DL
KETONES UR STRIP-MCNC: NEGATIVE MG/DL
LEUKOCYTE ESTERASE UR QL STRIP: NEGATIVE
NITRITE UR QL STRIP: NEGATIVE
PH UR STRIP: 5 [PH]
PROT UR STRIP-MCNC: NEGATIVE MG/DL
SARS-COV-2 RNA RESP QL NAA+PROBE: DETECTED
SP GR UR STRIP: >=1.03
UROBILINOGEN UR STRIP-ACNC: <2 MG/DL

## 2022-05-02 PROCEDURE — 70360 X-RAY EXAM OF NECK: CPT | Performed by: EMERGENCY MEDICINE

## 2022-05-02 PROCEDURE — 70250 X-RAY EXAM OF SKULL: CPT | Performed by: EMERGENCY MEDICINE

## 2022-05-02 PROCEDURE — 99214 OFFICE O/P EST MOD 30 MIN: CPT

## 2022-05-02 PROCEDURE — 74018 RADEX ABDOMEN 1 VIEW: CPT | Performed by: EMERGENCY MEDICINE

## 2022-05-02 PROCEDURE — 81002 URINALYSIS NONAUTO W/O SCOPE: CPT

## 2022-05-02 PROCEDURE — 71045 X-RAY EXAM CHEST 1 VIEW: CPT | Performed by: EMERGENCY MEDICINE

## 2022-05-02 RX ORDER — HYDROCODONE BITARTRATE AND ACETAMINOPHEN 5; 325 MG/1; MG/1
1-2 TABLET ORAL EVERY 6 HOURS PRN
Qty: 20 TABLET | Refills: 0 | Status: SHIPPED | OUTPATIENT
Start: 2022-05-02 | End: 2022-05-02

## 2022-05-02 RX ORDER — HYDROCODONE BITARTRATE AND ACETAMINOPHEN 5; 325 MG/1; MG/1
1-2 TABLET ORAL EVERY 6 HOURS PRN
Qty: 20 TABLET | Refills: 0 | Status: SHIPPED | OUTPATIENT
Start: 2022-05-02 | End: 2022-05-09

## 2022-05-02 RX ORDER — PROMETHAZINE HYDROCHLORIDE 25 MG/1
25 TABLET ORAL EVERY 6 HOURS PRN
Qty: 20 TABLET | Refills: 0 | Status: SHIPPED | OUTPATIENT
Start: 2022-05-02

## 2022-05-02 RX ORDER — BEBTELOVIMAB 87.5 MG/ML
175 INJECTION, SOLUTION INTRAVENOUS ONCE
Status: COMPLETED | OUTPATIENT
Start: 2022-05-02 | End: 2022-05-02

## 2022-05-06 ENCOUNTER — HOSPITAL ENCOUNTER (OUTPATIENT)
Age: 57
Discharge: HOME | End: 2022-05-06
Attending: EMERGENCY MEDICINE
Payer: COMMERCIAL

## 2022-05-06 VITALS
HEART RATE: 89 BPM | SYSTOLIC BLOOD PRESSURE: 131 MMHG | RESPIRATION RATE: 18 BRPM | OXYGEN SATURATION: 99 % | TEMPERATURE: 98 F | DIASTOLIC BLOOD PRESSURE: 97 MMHG

## 2022-05-06 DIAGNOSIS — U07.1 COVID-19: Primary | ICD-10-CM

## 2022-05-06 LAB — SARS-COV-2 RNA RESP QL NAA+PROBE: DETECTED

## 2022-05-06 PROCEDURE — 99212 OFFICE O/P EST SF 10 MIN: CPT

## 2022-12-28 ENCOUNTER — TELEPHONE (OUTPATIENT)
Dept: GASTROENTEROLOGY | Facility: CLINIC | Age: 57
End: 2022-12-28
Payer: MEDICARE

## 2022-12-28 NOTE — TELEPHONE ENCOUNTER
Discussed with pt that Dr. Pino is a consultant that does not accept patients as a primary GI provider.  Discussed that she will send them back to their primary GI provider once their symptoms improved or the plan of care is established.     Pt was told the logistics of the appointment (arrival time, length of visit, total time spent at facility).     Pt was also told that Dr. Pino will review their previous workup but may order additional test and perform her own procedures. Patient agreed and verbalized understanding.      Pt says will obtain needed records from [facility] ( 2018 surgical report and manometry report)    DANYELL faxed to Dr Hayder Pierre    [phone]    successful @ 3:3:39 pm  Requesting all EGD with path reports for past 5 yrs  Last OV  notes  EGD with endoflip 11/2021       DANYELL faxed to Dr Bucio   [phone]      successful @ 3:38pm  Requesting all EGD w/path reports for past 5 yrs  Last ov notes  Esophagram and/or Mbss ~ 2021    Explained will need to obtain [facility] medical records before scheduling NP apt

## 2022-12-30 ENCOUNTER — TELEPHONE (OUTPATIENT)
Dept: GASTROENTEROLOGY | Facility: CLINIC | Age: 57
End: 2022-12-30
Payer: MEDICARE

## 2022-12-30 NOTE — TELEPHONE ENCOUNTER
Medical records received from [facility] requested from Dr. Pierre  Last ov note  UGI w/KUB 7/17/21  UGI w/ SBFT 7/20/20  Ct chest 5/5/21   EGD 5/26/21  Colonoscopy 8/12/22 8/5/20

## 2023-02-06 ENCOUNTER — PATIENT MESSAGE (OUTPATIENT)
Dept: GASTROENTEROLOGY | Facility: CLINIC | Age: 58
End: 2023-02-06
Payer: MEDICARE

## 2023-02-06 ENCOUNTER — TELEPHONE (OUTPATIENT)
Dept: GASTROENTEROLOGY | Facility: CLINIC | Age: 58
End: 2023-02-06
Payer: MEDICARE

## 2023-02-06 NOTE — TELEPHONE ENCOUNTER
Called pt to schedule NP apt  Pt says she has her records from [facility] and will try and send via portal or will fax to us.  Fax no provided  Tech support ph no given with portal questions.

## 2023-03-01 ENCOUNTER — OFFICE VISIT (OUTPATIENT)
Dept: GASTROENTEROLOGY | Facility: CLINIC | Age: 58
End: 2023-03-01
Payer: COMMERCIAL

## 2023-03-01 ENCOUNTER — PATIENT MESSAGE (OUTPATIENT)
Dept: GASTROENTEROLOGY | Facility: CLINIC | Age: 58
End: 2023-03-01

## 2023-03-01 ENCOUNTER — TELEPHONE (OUTPATIENT)
Dept: GASTROENTEROLOGY | Facility: CLINIC | Age: 58
End: 2023-03-01
Payer: MEDICARE

## 2023-03-01 DIAGNOSIS — R68.81 EARLY SATIETY: ICD-10-CM

## 2023-03-01 DIAGNOSIS — R13.10 DYSPHAGIA, UNSPECIFIED TYPE: ICD-10-CM

## 2023-03-01 DIAGNOSIS — M33.13 DERMATOMYOSITIS: ICD-10-CM

## 2023-03-01 DIAGNOSIS — R13.12 DYSPHAGIA, OROPHARYNGEAL PHASE: ICD-10-CM

## 2023-03-01 DIAGNOSIS — R14.2 BELCHING: ICD-10-CM

## 2023-03-01 DIAGNOSIS — K21.9 GASTROESOPHAGEAL REFLUX DISEASE, UNSPECIFIED WHETHER ESOPHAGITIS PRESENT: ICD-10-CM

## 2023-03-01 DIAGNOSIS — R10.9 ABDOMINAL PAIN, UNSPECIFIED ABDOMINAL LOCATION: ICD-10-CM

## 2023-03-01 DIAGNOSIS — R07.89 NON-CARDIAC CHEST PAIN: ICD-10-CM

## 2023-03-01 DIAGNOSIS — R11.2 NAUSEA AND VOMITING, UNSPECIFIED VOMITING TYPE: ICD-10-CM

## 2023-03-01 DIAGNOSIS — R19.7 DIARRHEA, UNSPECIFIED TYPE: ICD-10-CM

## 2023-03-01 DIAGNOSIS — T17.320A CHOKING DUE TO FOOD IN LARYNX, INITIAL ENCOUNTER: Primary | ICD-10-CM

## 2023-03-01 DIAGNOSIS — R14.0 BLOATING: ICD-10-CM
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
## 2023-03-01 PROCEDURE — 99205 OFFICE O/P NEW HI 60 MIN: CPT | Mod: 95,,, | Performed by: INTERNAL MEDICINE

## 2023-03-01 PROCEDURE — 99205 PR OFFICE/OUTPT VISIT, NEW, LEVL V, 60-74 MIN: ICD-10-PCS | Mod: 95,,, | Performed by: INTERNAL MEDICINE

## 2023-03-01 NOTE — PATIENT INSTRUCTIONS
-Complete modified barium swallow    A barium swallow test is a special type of imaging test that uses barium and X-rays to create images of the back of your mouth and throat (pharynx) and your esophagus.  This allows the medical team to see how you swallow.  The speech pathologist will observe the coordination of anatomical structures in the mouth and throat, as they are actively functioning when chewing, drinking and swallowing. This will also identify whether food or liquid is entering your lungs while you swallow.     -Complete timed barium esophagogram   Barium esophagram is an X-ray of the esophagus. The patient drinks a liquid that contains barium (a silver-white metallic compound). The liquid coats the esophagus and X-rays are taken.    -Complete EGD with EndoFlip  EGD is an endoscopic procedure that allows your doctor to examine your esophagus, stomach and duodenum (part of your small intestine). EGD is an outpatient procedure, meaning you can go home that same day. It takes approximately 30 to 60 minutes to perform.  EndoFLIP is a technology that simultaneously measures the area across the inside of a gastrointestinal organ (for example, the esophagus) and the pressure inside that organ. The ratio of the two measurements is called distensibility (stiffness).    -Complete esophageal manometry   During esophageal manometry, a thin, flexible tube (catheter) that contains pressure sensors is passed through your nose, down your esophagus and into your stomach. Esophageal manometry can be helpful in diagnosing certain disorders that can affect your esophagus.  Esophageal manometry provides information about the movement of food through the esophagus into the stomach. The test measures how well the muscles at the top and bottom of your esophagus (sphincter muscles) open and close, as well as the pressure, speed and pattern of the wave of esophageal muscle contractions that moves food along.    -Complete gastric  emptying study   A gastric emptying study is used to assess your stomach's motility, or its ability to empty itself. Slow gastric emptying (gastroparesis) can cause symptoms such as pain, distention, gastroesophageal reflux, nausea, vomiting and poor appetite.    -Start peppermint three to four times per day (altoids, peppermint tea, peppermint oil (four drops in half glass of water)

## 2023-03-01 NOTE — PROGRESS NOTES
The patient location is: home  The chief complaint leading to consultation is: gerd, dysphagia, chest pain, nausea, vomiitng, abd pain, diarrhea     Visit type: audiovisual    Face to Face time with patient: 50  87 minutes of total time spent on the encounter, which includes face to face time and non-face to face time preparing to see the patient (eg, review of tests), Obtaining and/or reviewing separately obtained history, Documenting clinical information in the electronic or other health record, Independently interpreting results (not separately reported) and communicating results to the patient/family/caregiver, or Care coordination (not separately reported).         Each patient to whom he or she provides medical services by telemedicine is:  (1) informed of the relationship between the physician and patient and the respective role of any other health care provider with respect to management of the patient; and (2) notified that he or she may decline to receive medical services by telemedicine and may withdraw from such care at any time.    Notes:        [facility] Consultation Note    Reason for Consult:  No chief complaint on file.        PCP:   Gena Tineo   [address] / [facility] [address]    Referring MD:  Gena Tineo Md  [address] [place] [address]    Dr. Dr. Spencer Bucio   Surg: Dr. Cruz at [facility] 2 years ago    HPI:  Hattie Villalpando is a 57 y.o. female referred to motility clinic for second opinion regarding the following problems:    Seen multiple doctors, told that needs a redo     GERD.   Gr B esophagitis   Retrosternal pyrosis: none w PPI   Regurgitation: every night   Onset: years   Improve with: PPI         Upright symptoms: yes  Nocturnal symptoms: worse     Caffeine intake: 1 cup per day   Sleeps with head of the bed elevated - adjustable bed   Avoids eating prior to bedtime.  4 hours     05/15/2018:  Robotic assisted  laparoscopic hiatal hernia repair with Nissen fundoplication and EGD  w Dr. Zimmer  Paraesophageal hernia, diverticulum 11/2022  Symptoms resolved for 2 years, than developed chest pain, than realized has a recurrent HH    Ref to Dr. Cruz.  Unable to complete manometry bc discomfort.  Dr. Cruz told her he cannot help her without manometry      MEDS:   Protonix 40mg BID   Carafate     Dysphagia.   Problems with solids: daily    Problems with liquids:     Some improvement with dilation Savory 48.  Multiple.  Last 5/2021    MEDS:   Flexeril- QHS.  Long time ago   Norco - daily or more.  Long time  ago   Marijuana: medical gummies - rare for HA, bc does not like the way you     Peppermint tea     Choking while eating: occasional   Coughing while eating: occasional   Dermatomyositis  MBS nl 2017     Chest pain  Feels that someone is sitting on her chest and is suffocating   No related to meals   Started 2 years ago  Triggers (cold fluids, caffeine, smoking, ETOH): no   Cardiology carmichael snot think this is cardiac   Pulm does not think its related     Nausea: Daily   Early satiety: Daily   Vomiting: Occasional. Every night when lays on her side ? Regurgitation     Nl GE on while gut transit 2017     MEDS:   Phenergan     Rumination Syndrome  Symptoms occur within 30 minutes of finishing a meal: no   Regurgitate lacks sour or bitter taste: yes  Regurgitate described as tasting similar to the food recently ingested: yes  Little or no improvement after GERD or nausea directed treatment: yes  No symptoms during sleep: no   No symptoms during fasting: yes  Weight loss (40% of patients): no   Related to reflux: yes  Related to belching: yes, improved w surgery     Abd pain   RUQ  LLQ  Seen in ED for abd pain     No meds    Bloating     Diarrhea   [place] 6-7  BM 5-6 per day     FI: no  Nocturnal: yes    MEDS:   None     Mother w ? High risk polyp     DVTs  Eliquis     Denies  BRBPR, melena, weight loss.       Chronic pain    Chronic Regional Pain Syndrome   DDD  Lyrica   Flexeril   Norco     Dermatomyositis   Higher risk of colon cancer.  Needs EGD/colon yearly    IVIG  Cellcept     DVTs  Eliquis     Total visit time was  87  minutes, more than 50% of which was spent in face-to-face counseling with patient regarding symptoms, diagnostic results, prognosis, risks and benefits of treatment options, instructions for management, importance of compliance with chosen treatment options and coping strategies.      Previous Studies:   Extensive record review.  25 minutes   CT abdomen 11/03/2022:  No acute process.  3 mm renal stone nonobstructing.  No hydronephrosis.  Small moderate type 3 paraesophageal hernia.  Intact ventricular peritoneal shunt without associated pseudocyst.  No ascites  Esophagram 06/22/2022:  Mild tertiary contractions in distal esophagus.  Hiatal hernia versus diverticulum.  Grossly similar.  Colonoscopy 2021 w polyp. Repeat yearly   EGD 11/05/2021: Normal esophagus.  Medium-size hiatal hernia with pseudodiverticulum forming within.  Erythematous mucosa in gastric body and antrum (-).  Normal duodenum.  Fundoplication was found.  The wrap appears loose.  Flip suggestive of normal esophageal contractility  EGD 05/26/2021: [place] grade B esophagitis.  Dilated.  Small hiatal hernia.  Normal duodenum.  Normal stomach.  No specimens collected.  Forty-eight [place] Savary dilation.  Upper GI 05/17/2021:  3 x 4 cm distal esophageal diverticulum recurrence of surgical repair hiatal hernia  CT angiogram chest abdomen and pelvis 04/20/2022: Unchanged type 3 moderate paraesophageal hernia comparison to CT 02/2022: Bilateral greater saphenous veins are patent along the entire course measuring 5 mm in diameter at the knee.  Preserved tree vessel runoff to both lower extremities.  No significant anterior and floor outflow disease.  Intact visualized portion of the ventriculoperitoneal shunt with no adjacent fluid collection or pseudocyst  formation.  3 mm obstructing ring renal stone.  Upper GI 07/20/2020: Nonspecific rapid small bowel transit.  Nonspecific benign distal ileum diverticulosis  shunt catheter and typical chronic bony findings.  EGD 04/19/2018: Dr. Zimmer.  Large [place] grade 3 hernia.  Z-line 32 cm the en of hernia was 39 cm.  Will most likely need Colace gastroplasty.   CT scan of abdomen and pelvis without contrast 11/19/2017: No evidence of renal or ureteral stones.  Previous appendectomy and hysterectomy.  Moderate size hiatal hernia.  No other significant abnormality  Modified barium swallow 10/11/2017:  No significant decompensation in oropharyngeal swallow.  Moderate size hiatal hernia.  Mild delay in esophageal emptying.  No GERD despite water siphon challenge.  See pathology report.  GI scintigraphy at [facility] 10/12/2017: Esophageal transit study negative for delayed emptying.  Liquid only gastric emptying study is negative for delayed emptying.  Combined liquid solids study is negative for delayed liquid gastric emptying and negative for delayed solid gastric emptying.  Small-bowel study is negative for delayed small bowel transit.  Large bowel study is negative for delayed colonic and rectosigmoid transit.  Bravo 06/08/2017:  Of acid suppressive therapy.  Increased acid exposure.  [place] 44.  Total % spent in reflux 16%.  Correlation between heartburn, chest pain and regurgitation and reflux events.  EGD 05/18/2017: Large hiatal hernia with associated erosion and erythema.  Normal stomach.  Normal duodenum.  Bravo successfully placed. Path-  Bravo 05/18/2017:  Colonoscopy 05/18/2017:  Normal terminal ileum and colonic mucosa.  Random biopsies ().  Internal and external hemorrhoids.  Manometry 07/07/2016: Normal esophageal manometry.  Residual pressure of upper esophageal sphincter appears to be quite high.  This patient may benefit from ENT evaluation for this disorder.  If it is not felt to be significant from ENT  standpoint can consider TCA or empiric dilation.  Dr. Aguirre   EGD 2013:  Ring in GE junction.  Dilation 48 [place].  Hiatal hernia.  Erosion in antrum ().  Normal mucosa in duodenum.  Colonoscopy 2008:  Grade 1 internal hemorrhoids.  Mild erythema in rectum sigmoid colon and cecum ().  Normal mucosa in descending colon transverse colon ascending colon.  EGD 2006.  Grade 2 esophagitis.  Erythema interval control compatible with gastritis.  Normal mucosa in duodenum.    Relevant Surgical History:    05/15/2018:  Robotic assisted laparoscopic hiatal hernia repair with Nissen fundoplication and EGD  w Dr. Zimmer    ROS:  ROS   Complete ROS negative except as above    Medical History:   Past Medical History:   Diagnosis Date    Acid reflux     Acquired lymphedema     Arthritis     Bladder infection     hx of    Chronic back pain     Chronic migraine     Complex regional pain syndrome     Complex regional pain syndrome I, unspecified     Diverticulitis     Diverticulosis     Hiatal hernia     High blood pressure     Hx of dermatomyositis     Hyperlipidemia     IBS (irritable bowel syndrome)     Increased intracranial pressure     Pneumonia     SVT (supraventricular tachycardia)     Unspecified hemorrhoids         Surgical History:   Past Surgical History:   Procedure Laterality Date     necrotizing fasciitis surgery      APPENDECTOMY      breast reduction  2007    cervical fusion       SECTION, LOW TRANSVERSE      ,     CSF SHUNT      HYSTERECTOMY      INTRALUMINAL GASTROINTESTINAL TRACT IMAGING VIA CAPSULE N/A 10/18/2018    Procedure: video capsule study at 7:30am;  Surgeon: John Flores MD;  Location: [facility];  Service: Endoscopy;  Laterality: N/A;    JOINT REPLACEMENT      rt hip    LIPOSUCTION      TUBAL LIGATION      VENTRICULOPERITONEAL SHUNT          Family History:   Family History   Problem Relation Age of Onset    Cancer Mother         skin    Colon polyps Mother      Heart disease Father          age 55    Cancer Sister         skin    Heart disease Sister     Other Brother         accident,  age 21    Colon cancer Neg Hx     Esophageal cancer Neg Hx     Rectal cancer Neg Hx     Stomach cancer Neg Hx         Social History:   Social History     Socioeconomic History    Marital status:    Occupational History     Employer: [employer]   Tobacco Use    Smoking status: Former     Packs/day: 1.00     Years: 10.00     Pack years: 10.00     Types: Cigarettes     Quit date: 10/11/1995     Years since quittin.4    Smokeless tobacco: Former   Substance and Sexual Activity    Alcohol use: No    Drug use: No    Sexual activity: Not Currently        Review of patient's allergies indicates:   Allergen Reactions    Doxycycline Other (See Comments)    Iodine and iodide containing products Swelling    Esomeprazole magnesium      Other reaction(s): Gastritis    Iodinated contrast media      Other reaction(s): RASH, BP ELEVATION, PALPITATIONS.    Povidone-iodine      Other reaction(s): THROAT SWELLING, EYES SWOLLEN SHUT, HIVES, ITCHING    Scopolamine      Other reaction(s): massive migraine    Shellfish containing products      Other reaction(s): SWELLING,HIVES    Cephalexin Rash       Current Outpatient Medications   Medication Sig Dispense Refill    acetaminophen (TYLENOL) 325 MG tablet Take 650 mg by mouth.      ALPRAZolam (XANAX) 0.5 MG tablet as needed.       CARAFATE 100 mg/mL suspension TAKE 2 TEASPOONFULS (10 MLS) BY MOUTH TWICE DAILY  5    carvedilol (COREG) 6.25 MG tablet       cyclobenzaprine (FLEXERIL) 5 MG tablet       diphenhydrAMINE (BENADRYL) 25 mg capsule Take 25 mg by mouth as needed.       ELIQUIS 5 mg Tab Take 5 mg by mouth 2 (two) times daily.      hydrocodone-acetaminophen 7.5-325mg (NORCO) 7.5-325 mg per tablet Take 1 tablet by mouth every 4 (four) hours as needed. for pain.  0    immune globul G-gly-IgA avg 46 40 gram/400 mL (10 %) Soln  Inject 75 mg into the vein.      MEDICAL SUPPLY, MISCELLANEOUS (MISCELLANEOUS MEDICAL SUPPLY [facility] Patient has an implanted device with an external monitor.      mycophenolate (CELLCEPT) 500 mg Tab       promethazine (PHENERGAN) 25 MG tablet Take 25 mg by mouth.      aspirin (ECOTRIN) 325 MG EC tablet TAKE 2 TABLETS BY MOUTH TWICE A DAY FOR 15 DAYS      bacitracin (AK-TRACIN) ophthalmic ointment 1 application 2 (two) times daily. Apply to affected area for seven days  0    butalbital-aspirin-caffeine -40 mg (BUTALBITAL-ASPIRIN-CAFFEINE) -40 mg Cap Take 1 capsule by mouth every 6 (six) hours as needed.        diclofenac sodium 1 % Gel as needed.       fluocinolone (SYNALAR) 0.01 % external solution Apply to scalp as needed for itching      fluocinonide (LIDEX) 0.05 % external solution       ketorolac (TORADOL) 10 mg tablet Take 10 mg by mouth every 8 (eight) hours as needed.  0    mupirocin (BACTROBAN) 2 % ointment Apply to affected area 3 times daily 22 g 1    pregabalin (LYRICA) 25 MG capsule Take 1 capsule (25 mg total) by mouth 2 (two) times daily. 60 capsule 6    promethazine (PHENERGAN) 25 MG suppository INSERT 1 RECTALLY EVERY 8 HOURS AS NEEDED FOR NAUSEA  1    promethazine (PHENERGAN) 25 MG tablet TAKE 1 TABLET BY MOUTH EVERY 4-6 HOURS AS NEEDED FOR NAUSEA.       No current facility-administered medications for this visit.        Objective Findings:  Vital Signs:  There were no vitals taken for this visit.  There is no height or weight on file to calculate BMI.    Physical Exam:  Physical Exam:limited due to video visit  General appearance: alert, cooperative, no distress  HENT: Normocephalic, atraumatic, neck symmetrical, no nasal discharge  Eyes: conjunctivae/corneas clear,  EOM's intact  Extremities: visible extremities symmetric; no clubbing, cyanosis, or edema  Integument: visible Skin color, texture, turgor normal; no rashes; hair distrubution normal  Neurologic: Alert and oriented X  3,  Psychiatric: no pressured speech; normal affect; no evidence of impaired cognition    Labs:   Reviewed in Epic/record      Assessment and Plan:  Hattie Villalpando is a 57 y.o. female with referred to Esophageal Motility Clinic for 2nd opinion regarding following problems:    GERD.   Gr B esophagitis   05/15/2018:  Robotic assisted laparoscopic hiatal hernia repair with Nissen fundoplication w Dr. Zimmer  EGDs w recurrent HH  3cm Paraesophageal hernia +/- diverticulum CT 11/2022  -GERD precautions   -Protonix 40mg BID   -Carafate     Dysphagia to solids  Some improvement with dilation Savory 48, last 5/2021  On Flexeri, Norco for years   Occasional marijuana  -Manometry r/o dysphagia and rumination and belching   -TBS  -EGD w EOE, G, D, dissach, FLIP.  Higher than avg risk procedure     Choking while eating  High residual UES   Dermatomyositis  MBS nl 2017   -MBS    Chest pain  Feels that someone is sitting on her chest and is suffocating   No related to meals   -Manometry r/o dysphagia and rumination and belching   -Peppermint    Nausea. Early satiety. Vomiting  Nl GE on while gut transit 2017   -Phenergan prn   -GES  -Def to primary Gi     Rumination Syndrome ROS+  Belching   -Manometry r/o dysphagia and rumination and belching     Abd pain   RUQ  LLQ  -Def to primary Gi     Bloating   -Def to primary Gi     Diarrhea   Nocturnal   Cellcept   Colonoscopy 2021  -Def to primary Gi     Mother w High risk polyp  Dermatomyositis w + gene that increased CRC risk  -Colonoscopy yearly per pt   -Def to primary Gi     DVTs  Eliquis     Chronic pain   Chronic Regional Pain Syndrome   DDD  Lyrica   Flexeril   Norco     Dermatomyositis   Higher risk of colon cancer.  Needs EGD/colon yearly    IVIG  Cellcept     Increased intracranial pressure   S/p shunt     Follow up in about 3 months (around 6/1/2023).    1. Choking due to food in larynx, initial encounter    2. Dysphagia, oropharyngeal phase    3. Gastroesophageal reflux  disease, unspecified whether esophagitis present    4. Dysphagia, unspecified type    5. Non-cardiac chest pain    6. Early satiety    7. Nausea and vomiting, unspecified vomiting type    8. Bloating    9. Belching    10. Diarrhea, unspecified type    11. Abdominal pain, unspecified abdominal location    12. Dermatomyositis          Order summary:  Orders Placed This Encounter    Fl Modified Barium Swallow Speech    FL Esophagram Complete    NM Gastric Emptying    SLP video swallow       Discussed with PT that I act as a consult service and do not accept patients to be their primary GI provider. Discussed that the goal of our visits is to address relevant motility problems while deferring other GI problems as well as screening and surveillance to his/her primary GI provider.   Discussed that he/she needs to continue to follow with his local primary GI provider.  Discussed that we will complete his/her workup, clarify diagnosis and attempt to optimize his/her symptoms with intention of him/her returning to referring GI provider for long term GI care.   Pt verbalized understanding.        Thank you so much for allowing me to participate in the care of Hattie Pino MD      This note was created using voice recognition software, and may contain some unrecognized transcriptional errors.

## 2023-03-01 NOTE — TELEPHONE ENCOUNTER
MOTILITY CLINIC PROCEDURE ORDERS    CLEARANCE FOR PROCEDURES:  [x] Not needed   [] Cardiology   [] Pulmonary  [] PCP    PROCEDURES  [] EGD   [] Colonoscopy   [x] EGD with EndoFlip   [x] Esophageal manometry with impedance - dysphagia   [x] Esophageal manometry with impedance - rumination  [x] Esophageal manometry with impedance - belching   [] EGD with BRAVO 48 hours   [] EGD with BRAVO 96 hours   [] pH Impedance 24 hours   [] Anorectal manometry   [] Rectal Ultrasound     Does manometry need to be placed endoscopically:   [x] Yes    FLOOR:  [] 4th Floor   [x] 2nd Floor    Reason for 2nd Floor:   [] Gastroparesis   [] End stage achalasia  [] Pre lung transplant   [] Pre hear transplant   [] Pulmonary HTN (PAP>50 or on meds)   [] Severe pulmonary Disease   [x] Complex medical problems   [] BMI>50  [] History of anesthesia issues  [] Other:    PREP  [] Standard Prep  [] Severe Constipation Prep (Low residue diet 7 days.  1.5 prep the day prior, 0.5 prep the day of procedure)  [] Full liquid diet 5 days   [x] Full liquid diet 3 days   [] Full liquid diet 2 days   [] Full liquid diet 1 day   [] Other:     MEDICATIONS    pH Studies  [] ON PPI/H2 Blocker   [] OFF PPI/H2 Blocker     Metal allergy (stainless steal w chromium, nickel, copper, cobalt and iron).:   []  Yes    Pacemaker/defibrillator:  [] Yes    Motility Studies (esophageal manometry/anorectal manometry)  Hold narcotics x 1 days if able   Hold TCA x 1 days if able  Propofol/lidocaine only during sedation.  Discuss with Dr. Pino if additional sedation needed.   Hold baclofen for thee days (at least one day) if able   Hold muscle relaxants x 1 day if able     Anticoagulation/antiplt agents:   [x]  Yes ok on A/c if unable to hold    ORDER OF TESTING:  Day 1: EGD, FLIP, manometry  Day 2: TBS/MBS  Day 3: GES  Ok to change order if needed      [x] No special requirements - pt lives locally     SCHEDULING PRIORITY  [] Urgent  (<7 days)  [] Lung Transplant Workup  []  Priority (<30 days)  [x] Routine 1 (schedule ASAP)  [] Routine 2 (next available, < 3 months)   [] Routine 3 (ok if > 3 months)      URGENCY   [] Medically Urgent   [x] Medically NOT Urgent      DIAGNOSIS   [x] Dysphagia   [] EoE  [x] GERD   [] Nausea  [] Nausea/vomiting   [] Abd pain   [] Weight loss  [] Diarrhea  [] Melena  [] Hematochezia    [] CRC screening   [] Colon polyps  [] Other:       PHYSICIAN  []  Ok with Dr. Wadsworth   []  Ok with any GI MD

## 2023-03-03 ENCOUNTER — PATIENT MESSAGE (OUTPATIENT)
Dept: GASTROENTEROLOGY | Facility: CLINIC | Age: 58
End: 2023-03-03
Payer: MEDICARE

## 2023-03-03 ENCOUNTER — TELEPHONE (OUTPATIENT)
Dept: GASTROENTEROLOGY | Facility: CLINIC | Age: 58
End: 2023-03-03
Payer: MEDICARE

## 2023-03-03 VITALS — WEIGHT: 175 LBS | BODY MASS INDEX: 29.16 KG/M2 | HEIGHT: 65 IN

## 2023-03-03 DIAGNOSIS — K21.9 GASTROESOPHAGEAL REFLUX DISEASE, UNSPECIFIED WHETHER ESOPHAGITIS PRESENT: ICD-10-CM

## 2023-03-03 DIAGNOSIS — R13.10 DYSPHAGIA, UNSPECIFIED TYPE: Primary | ICD-10-CM

## 2023-03-03 RX ORDER — PANTOPRAZOLE SODIUM 40 MG/1
1 TABLET, DELAYED RELEASE ORAL 2 TIMES DAILY
COMMUNITY

## 2023-03-03 NOTE — TELEPHONE ENCOUNTER
Called and spoke with pt, asked pt to check her portal for a message from me.  AVS reviewed with pt.       Reviewed TBS/MBSS pre-procedure instruction with pt, will send to portal.    Asked for TBS/Mbs and GES to be scheduled next available except on wednesdays.    Asked patient to call the endoscopy  and leave a message that she is interested in scheduling her procedure(s) and also include any days that she would not be able to schedule her procedure(s).  Ph no provided.    Explained it may take up to 2 wks to receive a call back to schedule.

## 2023-03-06 ENCOUNTER — TELEPHONE (OUTPATIENT)
Dept: SPEECH THERAPY | Facility: HOSPITAL | Age: 58
End: 2023-03-06
Payer: MEDICARE

## 2023-03-06 NOTE — TELEPHONE ENCOUNTER
Cinthia pt to schedule mbss & timed eso. 4/3@2pm as requested. Informed to fast two hrs before test. [facility] radiology dept in main clinic.

## 2023-03-08 ENCOUNTER — TELEPHONE (OUTPATIENT)
Dept: ENDOSCOPY | Facility: HOSPITAL | Age: 58
End: 2023-03-08
Payer: MEDICARE

## 2023-03-08 ENCOUNTER — PATIENT MESSAGE (OUTPATIENT)
Dept: ENDOSCOPY | Facility: HOSPITAL | Age: 58
End: 2023-03-08
Payer: MEDICARE

## 2023-03-08 ENCOUNTER — PATIENT MESSAGE (OUTPATIENT)
Dept: GASTROENTEROLOGY | Facility: CLINIC | Age: 58
End: 2023-03-08
Payer: MEDICARE

## 2023-03-08 NOTE — TELEPHONE ENCOUNTER
Contacted patient to schedule procedure. As per our conversation,  patient is scheduled for EGD/Endoflip and Esophageal Manometry on 3/16/23 at 8:00 am and 10:00 am. Instructions reviewed with patient and informed instructions will be emailed to [email or web address] and on patient portal for review.

## 2023-03-16 ENCOUNTER — ANESTHESIA EVENT (OUTPATIENT)
Dept: ENDOSCOPY | Facility: HOSPITAL | Age: 58
End: 2023-03-16
Payer: COMMERCIAL

## 2023-03-16 ENCOUNTER — HOSPITAL ENCOUNTER (OUTPATIENT)
Facility: HOSPITAL | Age: 58
Discharge: HOME | End: 2023-03-16
Attending: INTERNAL MEDICINE | Admitting: INTERNAL MEDICINE
Payer: COMMERCIAL

## 2023-03-16 ENCOUNTER — ANESTHESIA (OUTPATIENT)
Dept: ENDOSCOPY | Facility: HOSPITAL | Age: 58
End: 2023-03-16
Payer: COMMERCIAL

## 2023-03-16 VITALS
SYSTOLIC BLOOD PRESSURE: 132 MMHG | HEIGHT: 65 IN | OXYGEN SATURATION: 96 % | DIASTOLIC BLOOD PRESSURE: 89 MMHG | TEMPERATURE: 98 F | BODY MASS INDEX: 29.16 KG/M2 | HEART RATE: 72 BPM | WEIGHT: 175 LBS | RESPIRATION RATE: 16 BRPM

## 2023-03-16 DIAGNOSIS — R13.10 DYSPHAGIA: ICD-10-CM

## 2023-03-16 PROCEDURE — D9220A PRA ANESTHESIA: Mod: CRNA,,, | Performed by: NURSE ANESTHETIST, CERTIFIED REGISTERED

## 2023-03-16 PROCEDURE — 43239 PR EGD, FLEX, W/BIOPSY, SGL/MULTI: ICD-10-PCS | Mod: ,,, | Performed by: INTERNAL MEDICINE

## 2023-03-16 PROCEDURE — 88305 TISSUE EXAM BY PATHOLOGIST: ICD-10-PCS | Mod: 26,,, | Performed by: PATHOLOGY

## 2023-03-16 PROCEDURE — 91010 ESOPHAGUS MOTILITY STUDY: CPT | Mod: TC | Performed by: INTERNAL MEDICINE

## 2023-03-16 PROCEDURE — 37000008 HC ANESTHESIA 1ST 15 MINUTES: Performed by: INTERNAL MEDICINE

## 2023-03-16 PROCEDURE — 25000003 PHARM REV CODE 250: Performed by: INTERNAL MEDICINE

## 2023-03-16 PROCEDURE — 63600175 PHARM REV CODE 636 W HCPCS: Performed by: NURSE ANESTHETIST, CERTIFIED REGISTERED

## 2023-03-16 PROCEDURE — 91040 PR ESOPH BALLOON DISTENSION TST: ICD-10-PCS | Mod: 26,,, | Performed by: INTERNAL MEDICINE

## 2023-03-16 PROCEDURE — 91037 ESOPH IMPED FUNCTION TEST: CPT | Mod: TC | Performed by: INTERNAL MEDICINE

## 2023-03-16 PROCEDURE — 91040 ESOPH BALLOON DISTENSION TST: CPT | Mod: 26,,, | Performed by: INTERNAL MEDICINE

## 2023-03-16 PROCEDURE — 91040 ESOPH BALLOON DISTENSION TST: CPT | Mod: TC | Performed by: INTERNAL MEDICINE

## 2023-03-16 PROCEDURE — 43239 EGD BIOPSY SINGLE/MULTIPLE: CPT | Mod: ,,, | Performed by: INTERNAL MEDICINE

## 2023-03-16 PROCEDURE — 43239 EGD BIOPSY SINGLE/MULTIPLE: CPT | Performed by: INTERNAL MEDICINE

## 2023-03-16 PROCEDURE — D9220A PRA ANESTHESIA: ICD-10-PCS | Mod: ANES,,, | Performed by: ANESTHESIOLOGY

## 2023-03-16 PROCEDURE — D9220A PRA ANESTHESIA: Mod: ANES,,, | Performed by: ANESTHESIOLOGY

## 2023-03-16 PROCEDURE — D9220A PRA ANESTHESIA: ICD-10-PCS | Mod: CRNA,,, | Performed by: NURSE ANESTHETIST, CERTIFIED REGISTERED

## 2023-03-16 PROCEDURE — 27201012 HC FORCEPS, HOT/COLD, DISP: Performed by: INTERNAL MEDICINE

## 2023-03-16 PROCEDURE — 37000009 HC ANESTHESIA EA ADD 15 MINS: Performed by: INTERNAL MEDICINE

## 2023-03-16 PROCEDURE — C1726 CATH, BAL DIL, NON-VASCULAR: HCPCS | Performed by: INTERNAL MEDICINE

## 2023-03-16 PROCEDURE — 82657 ENZYME CELL ACTIVITY: CPT | Performed by: PATHOLOGY

## 2023-03-16 PROCEDURE — 88305 TISSUE EXAM BY PATHOLOGIST: CPT | Mod: 59 | Performed by: PATHOLOGY

## 2023-03-16 PROCEDURE — 88305 TISSUE EXAM BY PATHOLOGIST: CPT | Mod: 26,,, | Performed by: PATHOLOGY

## 2023-03-16 RX ORDER — SODIUM CHLORIDE 0.9 % (FLUSH) 0.9 %
3 SYRINGE (ML) INJECTION
Status: DISCONTINUED | OUTPATIENT
Start: 2023-03-16 | End: 2023-03-16 | Stop reason: HOSPADM

## 2023-03-16 RX ORDER — PROPOFOL 10 MG/ML
INJECTION, EMULSION INTRAVENOUS CONTINUOUS PRN
Status: DISCONTINUED | OUTPATIENT
Start: 2023-03-16 | End: 2023-03-16

## 2023-03-16 RX ORDER — PROPOFOL 10 MG/ML
INJECTION, EMULSION INTRAVENOUS
Status: DISCONTINUED | OUTPATIENT
Start: 2023-03-16 | End: 2023-03-16

## 2023-03-16 RX ORDER — SODIUM CHLORIDE 9 MG/ML
INJECTION, SOLUTION INTRAVENOUS CONTINUOUS
Status: DISCONTINUED | OUTPATIENT
Start: 2023-03-16 | End: 2023-03-16 | Stop reason: HOSPADM

## 2023-03-16 RX ORDER — SODIUM CHLORIDE 9 MG/ML
INJECTION, SOLUTION INTRAVENOUS CONTINUOUS
Status: CANCELLED | OUTPATIENT
Start: 2023-03-16

## 2023-03-16 RX ORDER — LIDOCAINE HYDROCHLORIDE 20 MG/ML
JELLY TOPICAL ONCE
Status: COMPLETED | OUTPATIENT
Start: 2023-03-16 | End: 2023-03-16

## 2023-03-16 RX ORDER — LIDOCAINE HCL/PF 100 MG/5ML
SYRINGE (ML) INTRAVENOUS
Status: DISCONTINUED | OUTPATIENT
Start: 2023-03-16 | End: 2023-03-16

## 2023-03-16 RX ORDER — SODIUM CHLORIDE 0.9 % (FLUSH) 0.9 %
10 SYRINGE (ML) INJECTION
Status: CANCELLED | OUTPATIENT
Start: 2023-03-16

## 2023-03-16 RX ADMIN — PROPOFOL 20 MG: 10 INJECTION, EMULSION INTRAVENOUS at 08:03

## 2023-03-16 RX ADMIN — PROPOFOL 30 MG: 10 INJECTION, EMULSION INTRAVENOUS at 08:03

## 2023-03-16 RX ADMIN — PROPOFOL 200 MCG/KG/MIN: 10 INJECTION, EMULSION INTRAVENOUS at 08:03

## 2023-03-16 RX ADMIN — PROPOFOL 40 MG: 10 INJECTION, EMULSION INTRAVENOUS at 08:03

## 2023-03-16 RX ADMIN — SODIUM CHLORIDE: 0.9 INJECTION, SOLUTION INTRAVENOUS at 07:03

## 2023-03-16 RX ADMIN — LIDOCAINE HYDROCHLORIDE 10 ML: 20 JELLY TOPICAL at 08:03

## 2023-03-16 RX ADMIN — Medication 100 MG: at 08:03

## 2023-03-16 RX ADMIN — PROPOFOL 100 MG: 10 INJECTION, EMULSION INTRAVENOUS at 08:03

## 2023-03-16 NOTE — H&P
Short Stay Endoscopy History and Physical    PCP - Gena Tineo MD     Procedure - EGD/FLIP/mano  ASA - per anesthesia  Mallampati - per anesthesia  History of Anesthesia problems - no  Family history Anesthesia problems -  no   Plan of anesthesia - General    HPI:  This is a 57 y.o. female here for evaluation of dysphagia, gerd       Medical History:  has a past medical history of Acid reflux, Acquired lymphedema, Arthritis, Bladder infection, Chronic back pain, Chronic migraine, Complex regional pain syndrome, Complex regional pain syndrome I, unspecified, Diverticulitis, Diverticulosis, Hiatal hernia, High blood pressure, dermatomyositis, Hyperlipidemia, IBS (irritable bowel syndrome), Increased intracranial pressure, Pneumonia, SVT (supraventricular tachycardia), and Unspecified hemorrhoids.    Surgical History:  has a past surgical history that includes breast reduction ();  necrotizing fasciitis surgery; Liposuction; Appendectomy; Tubal ligation; cervical fusion;  section, low transverse; CSF shunt; Intraluminal gastrointestinal tract imaging via capsule (N/A, 10/18/2018); Joint replacement; Ventriculoperitoneal shunt; and Hysterectomy ().    Family History: family history includes COPD in her paternal grandfather; Cancer in her mother and sister; Colon polyps in her mother; Heart disease in her father and sister; Other in her brother.. Otherwise no colon cancer, inflammatory bowel disease, or GI malignancies.    Social History:  reports that she quit smoking about 27 years ago. Her smoking use included cigarettes. She has a 10.00 pack-year smoking history. She has quit using smokeless tobacco. She reports current drug use. Drug: Marijuana. She reports that she does not drink alcohol.    Review of patient's allergies indicates:   Allergen Reactions    Doxycycline Other (See Comments)    Iodine and iodide containing products Swelling    Esomeprazole magnesium      Other reaction(s): Gastritis     Iodinated contrast media      Other reaction(s): RASH, BP ELEVATION, PALPITATIONS.    Ondansetron hcl (pf) Other (See Comments)    Povidone-iodine      Other reaction(s): THROAT SWELLING, EYES SWOLLEN SHUT, HIVES, ITCHING    Scopolamine      Other reaction(s): massive migraine    Shellfish containing products      Other reaction(s): SWELLING,HIVES    Statins-hmg-coa reductase inhibitors Other (See Comments)     Muscle aches. Contraindicated with dermatomyositis    Cephalexin Rash       Medications:   Medications Prior to Admission   Medication Sig Dispense Refill Last Dose    CARAFATE 100 mg/mL suspension TAKE 2 TEASPOONFULS (10 MLS) BY MOUTH TWICE DAILY  5 3/15/2023    carvedilol (COREG) 6.25 MG tablet    3/16/2023    cyclobenzaprine (FLEXERIL) 5 MG tablet    Past Week    diphenhydrAMINE (BENADRYL) 25 mg capsule Take 25 mg by mouth as needed.    3/15/2023    hydrocodone-acetaminophen 7.5-325mg (NORCO) 7.5-325 mg per tablet Take 1 tablet by mouth every 4 (four) hours as needed. for pain.  0 Past Week    mycophenolate (CELLCEPT) 500 mg Tab    Past Week    pantoprazole (PROTONIX) 40 MG tablet Take 1 tablet by mouth 2 (two) times a day.   3/15/2023    promethazine (PHENERGAN) 25 MG tablet TAKE 1 TABLET BY MOUTH EVERY 4-6 HOURS AS NEEDED FOR NAUSEA.   3/16/2023    acetaminophen (TYLENOL) 325 MG tablet Take 650 mg by mouth.       ALPRAZolam (XANAX) 0.5 MG tablet as needed.    More than a month    aspirin (ECOTRIN) 325 MG EC tablet TAKE 2 TABLETS BY MOUTH TWICE A DAY FOR 15 DAYS       bacitracin (AK-TRACIN) ophthalmic ointment 1 application 2 (two) times daily. Apply to affected area for seven days  0     butalbital-aspirin-caffeine -40 mg (BUTALBITAL-ASPIRIN-CAFFEINE) -40 mg Cap Take 1 capsule by mouth every 6 (six) hours as needed.         diclofenac sodium 1 % Gel as needed.        ELIQUIS 5 mg Tab Take 5 mg by mouth 2 (two) times daily.   3/13/2023    fluocinolone (SYNALAR) 0.01 % external solution  Apply to scalp as needed for itching       fluocinonide (LIDEX) 0.05 % external solution        immune globul G-gly-IgA avg 46 40 gram/400 mL (10 %) Soln Inject 75 mg into the vein.       ketorolac (TORADOL) 10 mg tablet Take 10 mg by mouth every 8 (eight) hours as needed.  0     MEDICAL SUPPLY, MISCELLANEOUS (MISCELLANEOUS MEDICAL SUPPLY [facility]) Patient has an implanted device with an external monitor.       mupirocin (BACTROBAN) 2 % ointment Apply to affected area 3 times daily 22 g 1     pregabalin (LYRICA) 25 MG capsule Take 1 capsule (25 mg total) by mouth 2 (two) times daily. 60 capsule 6 More than a month    promethazine (PHENERGAN) 25 MG suppository INSERT 1 RECTALLY EVERY 8 HOURS AS NEEDED FOR NAUSEA  1     promethazine (PHENERGAN) 25 MG tablet Take 25 mg by mouth.          Physical Exam:    Vital Signs:   Vitals:    03/16/23 0750   BP: (!) 153/98   Pulse: 86   Resp: 18   Temp: 99.5 °F (37.5 °C)       I have explained the risks and benefits of endoscopy procedures to the patient including but not limited to bleeding, perforation, infection, and death.      Ca Pino MD

## 2023-03-16 NOTE — ANESTHESIA PREPROCEDURE EVALUATION
03/16/2023  Hattie Villalpando is a 57 y.o., female.  [facility]  Anesthesia Pre-Operative Evaluation         Patient Name: Hattie Villalpando  YOB: 1965  MRN: [id number]    SUBJECTIVE:     Pre-operative evaluation for Procedure(s) (LRB):  EGD (ESOPHAGOGASTRODUODENOSCOPY) (N/A)  MANOMETRY, ESOPHAGUS, WITH IMPEDANCE MEASUREMENT (N/A)     03/16/2023    Hattie Villalpando is a 57 y.o. female     Patient now presents for the above procedure(s).      LDA:       Prev airway:     Drips:       Patient Active Problem List   Diagnosis    Hernia, hiatal    Impaired gait    Right foot pain    Decreased range of motion of ankle    Leg weakness, bilateral    Diarrhea    Crohn's disease of small intestine with complication    Tear of right acetabular labrum    Right hip pain    Primary osteoarthritis of right hip       Review of patient's allergies indicates:   Allergen Reactions    Doxycycline Other (See Comments)    Iodine and iodide containing products Swelling    Esomeprazole magnesium      Other reaction(s): Gastritis    Iodinated contrast media      Other reaction(s): RASH, BP ELEVATION, PALPITATIONS.    Ondansetron hcl (pf) Other (See Comments)    Povidone-iodine      Other reaction(s): THROAT SWELLING, EYES SWOLLEN SHUT, HIVES, ITCHING    Scopolamine      Other reaction(s): massive migraine    Shellfish containing products      Other reaction(s): SWELLING,HIVES    Statins-hmg-coa reductase inhibitors Other (See Comments)     Muscle aches. Contraindicated with dermatomyositis    Cephalexin Rash       Current Inpatient Medications:      No current facility-administered medications on file prior to encounter.     Current Outpatient Medications on File Prior to Encounter   Medication Sig Dispense Refill    acetaminophen (TYLENOL) 325 MG tablet Take 650 mg by mouth.      ALPRAZolam  (XANAX) 0.5 MG tablet as needed.       aspirin (ECOTRIN) 325 MG EC tablet TAKE 2 TABLETS BY MOUTH TWICE A DAY FOR 15 DAYS      bacitracin (AK-TRACIN) ophthalmic ointment 1 application 2 (two) times daily. Apply to affected area for seven days  0    butalbital-aspirin-caffeine -40 mg (BUTALBITAL-ASPIRIN-CAFFEINE) -40 mg Cap Take 1 capsule by mouth every 6 (six) hours as needed.        CARAFATE 100 mg/mL suspension TAKE 2 TEASPOONFULS (10 MLS) BY MOUTH TWICE DAILY  5    carvedilol (COREG) 6.25 MG tablet       cyclobenzaprine (FLEXERIL) 5 MG tablet       diclofenac sodium 1 % Gel as needed.       diphenhydrAMINE (BENADRYL) 25 mg capsule Take 25 mg by mouth as needed.       ELIQUIS 5 mg Tab Take 5 mg by mouth 2 (two) times daily.      fluocinolone (SYNALAR) 0.01 % external solution Apply to scalp as needed for itching      fluocinonide (LIDEX) 0.05 % external solution       hydrocodone-acetaminophen 7.5-325mg (NORCO) 7.5-325 mg per tablet Take 1 tablet by mouth every 4 (four) hours as needed. for pain.  0    immune globul G-gly-IgA avg 46 40 gram/400 mL (10 %) Soln Inject 75 mg into the vein.      ketorolac (TORADOL) 10 mg tablet Take 10 mg by mouth every 8 (eight) hours as needed.  0    MEDICAL SUPPLY, MISCELLANEOUS (MISCELLANEOUS MEDICAL SUPPLY [facility]) Patient has an implanted device with an external monitor.      mupirocin (BACTROBAN) 2 % ointment Apply to affected area 3 times daily 22 g 1    mycophenolate (CELLCEPT) 500 mg Tab       pantoprazole (PROTONIX) 40 MG tablet Take 1 tablet by mouth 2 (two) times a day.      pregabalin (LYRICA) 25 MG capsule Take 1 capsule (25 mg total) by mouth 2 (two) times daily. 60 capsule 6    promethazine (PHENERGAN) 25 MG suppository INSERT 1 RECTALLY EVERY 8 HOURS AS NEEDED FOR NAUSEA  1    promethazine (PHENERGAN) 25 MG tablet TAKE 1 TABLET BY MOUTH EVERY 4-6 HOURS AS NEEDED FOR NAUSEA.      promethazine (PHENERGAN) 25 MG tablet Take 25 mg by mouth.          Past Surgical History:   Procedure Laterality Date     necrotizing fasciitis surgery      APPENDECTOMY      breast reduction  2007    cervical fusion       SECTION, LOW TRANSVERSE      ,     CSF SHUNT      HYSTERECTOMY  2014    INTRALUMINAL GASTROINTESTINAL TRACT IMAGING VIA CAPSULE N/A 10/18/2018    Procedure: video capsule study at 7:30am;  Surgeon: John Flores MD;  Location: [facility] ENDO;  Service: Endoscopy;  Laterality: N/A;    JOINT REPLACEMENT      rt hip    LIPOSUCTION      TUBAL LIGATION      VENTRICULOPERITONEAL SHUNT         Social History     Socioeconomic History    Marital status:    Occupational History     Employer: [employer]   Tobacco Use    Smoking status: Former     Packs/day: 1.00     Years: 10.00     Pack years: 10.00     Types: Cigarettes     Quit date: 10/11/1995     Years since quittin.4    Smokeless tobacco: Former   Substance and Sexual Activity    Alcohol use: No    Drug use: No    Sexual activity: Not Currently       OBJECTIVE:     Vital Signs Range (Last 24H):         Significant Labs:  Lab Results   Component Value Date    WBC 11.16 2014    HGB 14.4 2014    HCT 40.5 2014     2014    CHOL 249 (H) 2013    TRIG 116 2013    HDL 46 2013    ALT 31 2014    AST 22 2014     (L) 2014    K 3.8 2014     2014    CREATININE 0.9 2014    BUN 16 2014    CO2 26 2014    TSH 3.089 2013    INR 1.0 2014    HGBA1C 5.0 10/15/2020       Diagnostic Studies: No relevant studies.    EKG:   Results for orders placed or performed in visit on 21   IN OFFICE EKG 12-LEAD (to [place])    Collection Time: 21  3:58 PM    Narrative    Test Reason : R07.9,    Vent. Rate : 088 BPM     Atrial Rate : 088 BPM     P-R Int : 154 ms          QRS Dur : 080 ms      QT Int : 350 ms       P-R-T Axes : 053 -05 009 degrees     QTc Int : 423  ms    Normal sinus rhythm  Possible Left atrial enlargement  Borderline Abnormal ECG  When compared with ECG of 27-MAY-2014 16:50,  No significant change was found  Confirmed by Gisselle Nix MD ([id number]) on 8/23/2021 11:29:07 AM    Referred By: KUNAL LEE           Confirmed By:Gisselle Nix MD       2D ECHO:  TTE:  No results found for this or any previous visit.    RYANNE:  No results found for this or any previous visit.    ASSESSMENT/PLAN:         Pre-op Assessment    I have reviewed the Patient Summary Reports.       I have reviewed the Medications.     Review of Systems  Anesthesia Hx:  History of prior surgery of interest to airway management or planning: cervical fusion. Previous anesthesia: General   Social:  Former Smoker, No Alcohol Use 10 pack years, quit 10/11/95   Hematology/Oncology:  Hematology Normal        EENT/Dental:EENT/Dental Normal   Cardiovascular:   Exercise tolerance: poor Hypertension    Pulmonary:   Pneumonia    Renal/:  Renal/ Normal     Hepatic/GI:   Hiatal Hernia, GERD, well controlled    Neurological:   Neuromuscular Disease, Headaches migraines   Endocrine:  Endocrine Normal    Dermatological:  Skin Normal    Psych:  Psychiatric Normal           Physical Exam  General: Well nourished, Alert, Oriented and Cooperative    Airway:  Mallampati: II   Mouth Opening: Normal  TM Distance: Normal  Tongue: Normal  Neck ROM: Normal ROM    Dental:  Intact        Anesthesia Plan  Type of Anesthesia, risks & benefits discussed:    Anesthesia Type: Gen Natural Airway  Intra-op Monitoring Plan: Standard ASA Monitors  Post Op Pain Control Plan: multimodal analgesia and IV/PO Opioids PRN  Induction:  IV  Informed Consent: Informed consent signed with the Patient and all parties understand the risks and agree with anesthesia plan.  All questions answered.   ASA Score: 3  Anesthesia Plan Notes: Pt asked that we be careful with her  shunt on the Right side of her head.  States that this has gotten  fractured during previous other surgery    Ready For Surgery From Anesthesia Perspective.     .

## 2023-03-16 NOTE — TRANSFER OF CARE
"Anesthesia Transfer of Care Note    Patient: Hattie Villalpando    Procedure(s) Performed: Procedure(s) (LRB):  EGD (ESOPHAGOGASTRODUODENOSCOPY) (N/A)  MANOMETRY, ESOPHAGUS, WITH IMPEDANCE MEASUREMENT (N/A)    Patient location: [facility]    Anesthesia Type: general    Transport from OR: Transported from OR on room air with adequate spontaneous ventilation    Post pain: adequate analgesia    Post assessment: no apparent anesthetic complications    Post vital signs: stable    Level of consciousness: sedated    Nausea/Vomiting: no nausea/vomiting    Complications: none    Transfer of care protocol was followed      Last vitals:   Visit Vitals  /69   Pulse 76   Temp 36.5   Resp 18   Ht 5' 5" (1.651 m)   Wt 79.4 kg (175 lb)   SpO2 94%   Breastfeeding No   BMI 29.12 kg/m²     "

## 2023-03-16 NOTE — ANESTHESIA POSTPROCEDURE EVALUATION
Anesthesia Post Evaluation    Patient: Hattie Villalpando    Procedure(s) Performed: Procedure(s) (LRB):  EGD (ESOPHAGOGASTRODUODENOSCOPY) (N/A)  MANOMETRY, ESOPHAGUS, WITH IMPEDANCE MEASUREMENT (N/A)    Final Anesthesia Type: general      Patient location during evaluation: PACU  Patient participation: Yes- Able to Participate  Level of consciousness: awake and alert  Post-procedure vital signs: reviewed and stable  Pain management: adequate  Airway patency: patent    PONV status at discharge: No PONV  Anesthetic complications: no      Cardiovascular status: blood pressure returned to baseline  Respiratory status: unassisted  Hydration status: euvolemic  Follow-up not needed.          Vitals Value Taken Time   /89 03/16/23 0917   Temp 36.6 °C (97.9 °F) 03/16/23 0915   Pulse 73 03/16/23 0916   Resp 26 03/16/23 0916   SpO2 97 % 03/16/23 0916   Vitals shown include unvalidated device data.      No case tracking events are documented in the log.      Pain/Brian Score: Brian Score: 9 (3/16/2023  9:00 AM)

## 2023-03-16 NOTE — PROVATION PATIENT INSTRUCTIONS
Discharge Summary/Instructions after an Endoscopic Procedure  Patient Name: Hattie Villalpando  Patient MRN: [id number]  Patient YOB: 1965     Thursday, March 16, 2023  Ca Pino MD  Dear patient,  As a result of recent federal legislation (The Federal Cures Act), you may   receive lab or pathology results from your procedure in your [facility]   account before your physician is able to contact you. Your physician or   their representative will relay the results to you with their   recommendations at their soonest availability.  Thank you,  RESTRICTIONS:  During your procedure today, you received medications for sedation.  These   medications may affect your judgment, balance and coordination.  Therefore,   for 24 hours, you have the following restrictions:   - DO NOT drive a car, operate machinery, make legal/financial decisions,   sign important papers or drink alcohol.    ACTIVITY:  Today: no heavy lifting, straining or running due to procedural   sedation/anesthesia.  The following day: return to full activity including work.  DIET:  Eat and drink normally unless instructed otherwise.     TREATMENT FOR COMMON SIDE EFFECTS:  - Mild abdominal pain, nausea, belching, bloating or excessive gas:  rest,   eat lightly and use a heating pad.  - Sore Throat: treat with throat lozenges and/or gargle with warm salt   water.  - Because air was used during the procedure, expelling large amounts of air   from your rectum or belching is normal.  - If a bowel prep was taken, you may not have a bowel movement for 1-3 days.    This is normal.  SYMPTOMS TO WATCH FOR AND REPORT TO YOUR PHYSICIAN:  1. Abdominal pain or bloating, other than gas cramps.  2. Chest pain.  3. Back pain.  4. Signs of infection such as: chills or fever occurring within 24 hours   after the procedure.  5. Rectal bleeding, which would show as bright red, maroon, or black stools.   (A tablespoon of blood from the rectum is not serious, especially if    hemorrhoids are present.)  6. Vomiting.  7. Weakness or dizziness.  GO DIRECTLY TO THE NEAREST EMERGENCY ROOM IF YOU HAVE ANY OF THE FOLLOWING:      Difficulty breathing              Chills and/or fever over 101 F   Persistent vomiting and/or vomiting blood   Severe abdominal pain   Severe chest pain   Black, tarry stools   Bleeding- more than one tablespoon   Any other symptom or condition that you feel may need urgent attention  Your doctor recommends these additional instructions:  If any biopsies were taken, your doctors clinic will contact you in 1 to 2   weeks with any results.  - Discharge patient to home (with escort).   - Resume previous diet.   - Continue present medications.   - Await pathology results.   - The findings and recommendations were discussed with the patient.   - Patient has a contact number available for emergencies.  The signs and   symptoms of potential delayed complications were discussed with the   patient.  Return to normal activities tomorrow.  Written discharge   instructions were provided to the patient.   - Resume Eliquis (apixaban) at prior dose today.  For questions, problems or results please call your physician - Ca Pino MD at Work:  [phone].  [facility], EMERGENCY ROOM PHONE NUMBER: [phone]  IF A COMPLICATION OR EMERGENCY SITUATION ARISES AND YOU ARE UNABLE TO REACH   YOUR PHYSICIAN - GO DIRECTLY TO THE EMERGENCY ROOM.  Ca Pino MD  3/16/2023 8:48:38 AM  This report has been verified and signed electronically.  Dear patient,  As a result of recent federal legislation (The Federal Cures Act), you may   receive lab or pathology results from your procedure in your [facility]   account before your physician is able to contact you. Your physician or   their representative will relay the results to you with their   recommendations at their soonest availability.  Thank you,  PROVATION

## 2023-03-17 ENCOUNTER — TELEPHONE (OUTPATIENT)
Dept: GASTROENTEROLOGY | Facility: CLINIC | Age: 58
End: 2023-03-17
Payer: MEDICARE

## 2023-03-17 PROCEDURE — 91037 PR GERD TST W/ NASAL IMPEDENCE ELECTROD: ICD-10-PCS | Mod: 26,,, | Performed by: INTERNAL MEDICINE

## 2023-03-17 PROCEDURE — 91010 PR ESOPHAGEAL MOTILITY STUDY, MA2METRY: ICD-10-PCS | Mod: 26,,, | Performed by: INTERNAL MEDICINE

## 2023-03-17 PROCEDURE — 91037 ESOPH IMPED FUNCTION TEST: CPT | Mod: 26,,, | Performed by: INTERNAL MEDICINE

## 2023-03-17 PROCEDURE — 91010 ESOPHAGUS MOTILITY STUDY: CPT | Mod: 26,,, | Performed by: INTERNAL MEDICINE

## 2023-03-17 NOTE — TELEPHONE ENCOUNTER
Manometry Results:  Normal LES pressure with complete relaxation   No Ragan Classification abnormality   Complete bolus clearance  Hiatal hernia   Unable to perform provocative maneuvers   Possible Rumination Syndrome     Please let patient know that the manometry shows  Normal esophageal motility   Possible rumination syndrome     Recommendation:  Follow up with Dr. Pino after all studies completed

## 2023-03-17 NOTE — PROVATION PATIENT INSTRUCTIONS
Discharge Summary/Instructions after an Endoscopic Procedure  Patient Name: Hattie Villalpando  Patient MRN: [id number]  Patient YOB: 1965     Friday, March 17, 2023  Ca Pino MD  Dear patient,  As a result of recent federal legislation (The Federal Cures Act), you may   receive lab or pathology results from your procedure in your [facility]   account before your physician is able to contact you. Your physician or   their representative will relay the results to you with their   recommendations at their soonest availability.  Thank you,  RESTRICTIONS:  During your procedure today, you received medications for sedation.  These   medications may affect your judgment, balance and coordination.  Therefore,   for 24 hours, you have the following restrictions:   - DO NOT drive a car, operate machinery, make legal/financial decisions,   sign important papers or drink alcohol.    ACTIVITY:  Today: no heavy lifting, straining or running due to procedural   sedation/anesthesia.  The following day: return to full activity including work.  DIET:  Eat and drink normally unless instructed otherwise.     TREATMENT FOR COMMON SIDE EFFECTS:  - Mild abdominal pain, nausea, belching, bloating or excessive gas:  rest,   eat lightly and use a heating pad.  - Sore Throat: treat with throat lozenges and/or gargle with warm salt   water.  - Because air was used during the procedure, expelling large amounts of air   from your rectum or belching is normal.  - If a bowel prep was taken, you may not have a bowel movement for 1-3 days.    This is normal.  SYMPTOMS TO WATCH FOR AND REPORT TO YOUR PHYSICIAN:  1. Abdominal pain or bloating, other than gas cramps.  2. Chest pain.  3. Back pain.  4. Signs of infection such as: chills or fever occurring within 24 hours   after the procedure.  5. Rectal bleeding, which would show as bright red, maroon, or black stools.   (A tablespoon of blood from the rectum is not serious, especially if    hemorrhoids are present.)  6. Vomiting.  7. Weakness or dizziness.  GO DIRECTLY TO THE NEAREST EMERGENCY ROOM IF YOU HAVE ANY OF THE FOLLOWING:      Difficulty breathing              Chills and/or fever over 101 F   Persistent vomiting and/or vomiting blood   Severe abdominal pain   Severe chest pain   Black, tarry stools   Bleeding- more than one tablespoon   Any other symptom or condition that you feel may need urgent attention  Your doctor recommends these additional instructions:  If any biopsies were taken, your doctors clinic will contact you in 1 to 2   weeks with any results.  - Return to my office as previously scheduled.  For questions, problems or results please call your physician - Ca Pino MD at Work:  [phone].  [facility], EMERGENCY ROOM PHONE NUMBER: [phone]  IF A COMPLICATION OR EMERGENCY SITUATION ARISES AND YOU ARE UNABLE TO REACH   YOUR PHYSICIAN - GO DIRECTLY TO THE EMERGENCY ROOM.  Ca Pino MD  3/17/2023 4:00:23 PM  This report has been verified and signed electronically.  Dear patient,  As a result of recent federal legislation (The Federal Cures Act), you may   receive lab or pathology results from your procedure in your [facility]   account before your physician is able to contact you. Your physician or   their representative will relay the results to you with their   recommendations at their soonest availability.  Thank you,  PROVATION

## 2023-03-21 LAB
FINAL PATHOLOGIC DIAGNOSIS: NORMAL
FINAL PATHOLOGIC DIAGNOSIS: NORMAL
GROSS: NORMAL
Lab: NORMAL
Lab: NORMAL

## 2023-11-27 ENCOUNTER — OFFICE VISIT (OUTPATIENT)
Dept: URGENT CARE | Facility: CLINIC | Age: 58
End: 2023-11-27
Payer: COMMERCIAL

## 2023-11-27 VITALS
BODY MASS INDEX: 29.16 KG/M2 | HEIGHT: 65 IN | RESPIRATION RATE: 18 BRPM | HEART RATE: 95 BPM | SYSTOLIC BLOOD PRESSURE: 143 MMHG | DIASTOLIC BLOOD PRESSURE: 86 MMHG | WEIGHT: 175 LBS | OXYGEN SATURATION: 98 % | TEMPERATURE: 99 F

## 2023-11-27 DIAGNOSIS — J06.9 VIRAL URI WITH COUGH: Primary | ICD-10-CM

## 2023-11-27 DIAGNOSIS — R68.83 CHILLS: ICD-10-CM

## 2023-11-27 LAB
CTP QC/QA: YES
CTP QC/QA: YES
POC MOLECULAR INFLUENZA A AGN: NEGATIVE
POC MOLECULAR INFLUENZA B AGN: NEGATIVE
SARS-COV-2 AG RESP QL IA.RAPID: NEGATIVE

## 2023-11-27 PROCEDURE — 99213 OFFICE O/P EST LOW 20 MIN: CPT | Mod: S$GLB,,,

## 2023-11-27 PROCEDURE — 87502 POCT INFLUENZA A/B MOLECULAR: ICD-10-PCS | Mod: QW,S$GLB,,

## 2023-11-27 PROCEDURE — 87811 SARS-COV-2 COVID19 W/OPTIC: CPT | Mod: QW,S$GLB,,

## 2023-11-27 PROCEDURE — 87502 INFLUENZA DNA AMP PROBE: CPT | Mod: QW,S$GLB,,

## 2023-11-27 PROCEDURE — 87811 SARS CORONAVIRUS 2 ANTIGEN POCT, MANUAL READ: ICD-10-PCS | Mod: QW,S$GLB,,

## 2023-11-27 PROCEDURE — 99213 PR OFFICE/OUTPT VISIT, EST, LEVL III, 20-29 MIN: ICD-10-PCS | Mod: S$GLB,,,

## 2023-11-27 RX ORDER — PROMETHAZINE HYDROCHLORIDE AND DEXTROMETHORPHAN HYDROBROMIDE 6.25; 15 MG/5ML; MG/5ML
5 SYRUP ORAL EVERY 4 HOURS PRN
Qty: 180 ML | Refills: 0 | Status: SHIPPED | OUTPATIENT
Start: 2023-11-27 | End: 2023-12-04

## 2023-11-28 NOTE — PATIENT INSTRUCTIONS
- Rest.    - Drink plenty of fluids. Increasing your fluid intake will help loosen up mucous.  - Viral upper respiratory infections typically run their course in 10-14 days.      - You can take over-the-counter claritin, zyrtec, allegra, OR xyzal as directed. These are antihistamines that can help with runny nose, nasal congestion, sneezing, and helps to dry up post-nasal drip, which usually causes sore throat and cough.      - You can use Flonase (fluticasone) nasal spray as directed for sinus congestion and postnasal drip. This is a steroid nasal spray that works locally over time to decrease the inflammation in your nose/sinuses and help with allergic symptoms. This is not an quick- relief spray like afrin, but it works well if used daily.  Discontinue if you develop nose bleed  - Use nasal saline prior to Flonase.  - Use Ocean Spray Nasal Saline 1-3 puffs each nostril every 2-3 hours then blow out onto tissue. This is to irrigate the nasal passage way to clear the sinus openings. Use until sinus problem resolved.    - A Neti Pot with sterile saline can help break up nasal congestion and give relief.      - Chloraseptic throat spray can help numb the throat.     - Warm salt water gargles can help with sore throat.  - Warm tea with honey can help with sore throat and cough. Honey is a natural cough suppressant.     - Acetaminophen (tylenol) or Ibuprofen (advil,motrin) as directed as needed for fever/pain. Avoid tylenol if you have a history of liver disease. Do not take ibuprofen if you have a history of GI bleeding, kidney disease, or if you take blood thinners.   - Ibuprofen dosing for adults: 400 mg by mouth every 4-6 hours as needed. Max: 2400 mg/day; Info: use lowest effective dose, shortest effective treatment duration; give w/ food if GI upset occurs.  - Tylenol dosing for adults: [By mouth route, immediate-release form] Dose: 325-1000 mg by mouth every 4-6h as needed; Max: 1 g/4h and 4 g/day from all  sources. [By mouth route, extended-release form] Dose: 650-1300 mg Extended Release by mouth every 8h as needed; Max: 4 g/day from all sources.     - You must understand that you have received an Urgent Care treatment only and that you may be released before all of your medical problems are known or treated.   - You, the patient, will arrange for follow up care as instructed.   - If your condition worsens or fails to improve we recommend that you receive another evaluation at the ER immediately or contact your PCP to discuss your concerns or return here.   - Follow up with your PCP or specialty clinic as directed in the next 1-2 weeks if not improved or as needed.  You can call [phone] to schedule an appointment with the appropriate provider.    If your symptoms do not improve or worsen, go to the emergency room immediately.

## 2023-11-28 NOTE — PROGRESS NOTES
"Subjective:      Patient ID: Hattie Villalpando is a 58 y.o. female.    Vitals:  height is 5' 5" (1.651 m) and weight is 79.4 kg (175 lb). Her oral temperature is 98.8 °F (37.1 °C). Her blood pressure is 143/86 (abnormal) and her pulse is 95. Her respiration is 18 and oxygen saturation is 98%.     Chief Complaint: Cough and Sore Throat    This is a 58 y.o. female who presents today with a chief complaint of cough, congestion, sore throat. Patient symptoms started 2 days ago  .    Cough  This is a new problem. The current episode started in the past 7 days. The problem has been unchanged. The cough is Productive of sputum. Associated symptoms include chills, nasal congestion, postnasal drip and a sore throat. Pertinent negatives include no chest pain, ear congestion, ear pain or shortness of breath.   Sore Throat   Associated symptoms include coughing. Pertinent negatives include no ear pain or shortness of breath.       Constitution: Positive for chills.   HENT:  Positive for postnasal drip and sore throat. Negative for ear pain.    Cardiovascular:  Negative for chest pain.   Respiratory:  Positive for cough. Negative for shortness of breath.       Objective:     Physical Exam   Constitutional: She is oriented to person, place, and time. She appears well-developed. She is cooperative.  Non-toxic appearance. She does not appear ill. No distress.      Comments:Patient sits comfortably in exam chair. Answers questions in complete sentences. Does not show any signs of distress or discoloration.        HENT:   Head: Normocephalic and atraumatic.   Ears:   Right Ear: Hearing, tympanic membrane, external ear and ear canal normal. impacted cerumen  Left Ear: Hearing, tympanic membrane, external ear and ear canal normal. impacted cerumen  Nose: Rhinorrhea and congestion present. No mucosal edema or nasal deformity. No epistaxis. Right sinus exhibits no maxillary sinus tenderness and no frontal sinus tenderness. Left sinus exhibits " no maxillary sinus tenderness and no frontal sinus tenderness.   Mouth/Throat: Uvula is midline and mucous membranes are normal. No trismus in the jaw. Normal dentition. No uvula swelling. Posterior oropharyngeal erythema present. No oropharyngeal exudate or posterior oropharyngeal edema. No tonsillar exudate.   Eyes: Conjunctivae and lids are normal. No scleral icterus.   Neck: Trachea normal and phonation normal. Neck supple. No edema present. No erythema present. No neck rigidity present.   Cardiovascular: Normal rate, regular rhythm, normal heart sounds and normal pulses.   Pulmonary/Chest: Effort normal and breath sounds normal. No stridor. No respiratory distress. She has no decreased breath sounds. She has no wheezes. She has no rhonchi. She has no rales.   Abdominal: Normal appearance.   Musculoskeletal: Normal range of motion.         General: No deformity. Normal range of motion.   Lymphadenopathy:     She has no cervical adenopathy.        Right cervical: No superficial cervical, no deep cervical and no posterior cervical adenopathy present.       Left cervical: No superficial cervical, no deep cervical and no posterior cervical adenopathy present.   Neurological: She is alert and oriented to person, place, and time. She exhibits normal muscle tone. Coordination normal.   Skin: Skin is warm, dry, intact, not diaphoretic and not pale.   Psychiatric: Her speech is normal and behavior is normal. Judgment and thought content normal.   Nursing note and vitals reviewed.    Results for orders placed or performed in visit on 11/27/23   SARS Coronavirus 2 Antigen, POCT Manual Read   Result Value Ref Range    SARS Coronavirus 2 Antigen Negative Negative     Acceptable Yes    POCT Influenza A/B MOLECULAR   Result Value Ref Range    POC Molecular Influenza A Ag Negative Negative, Not Reported    POC Molecular Influenza B Ag Negative Negative, Not Reported     Acceptable Yes         Assessment:     1. Viral URI with cough    2. Chills        Plan:       Viral URI with cough  -     promethazine-dextromethorphan (PROMETHAZINE-DM) 6.25-15 mg/5 mL Syrp; Take 5 mLs by mouth every 4 (four) hours as needed (cough).  Dispense: 180 mL; Refill: 0    Chills  -     SARS Coronavirus 2 Antigen, POCT Manual Read  -     POCT Influenza A/B MOLECULAR             Patient Instructions   - Rest.    - Drink plenty of fluids. Increasing your fluid intake will help loosen up mucous.  - Viral upper respiratory infections typically run their course in 10-14 days.      - You can take over-the-counter claritin, zyrtec, allegra, OR xyzal as directed. These are antihistamines that can help with runny nose, nasal congestion, sneezing, and helps to dry up post-nasal drip, which usually causes sore throat and cough.      - You can use Flonase (fluticasone) nasal spray as directed for sinus congestion and postnasal drip. This is a steroid nasal spray that works locally over time to decrease the inflammation in your nose/sinuses and help with allergic symptoms. This is not an quick- relief spray like afrin, but it works well if used daily.  Discontinue if you develop nose bleed  - Use nasal saline prior to Flonase.  - Use Ocean Spray Nasal Saline 1-3 puffs each nostril every 2-3 hours then blow out onto tissue. This is to irrigate the nasal passage way to clear the sinus openings. Use until sinus problem resolved.    - A Neti Pot with sterile saline can help break up nasal congestion and give relief.      - Chloraseptic throat spray can help numb the throat.     - Warm salt water gargles can help with sore throat.  - Warm tea with honey can help with sore throat and cough. Honey is a natural cough suppressant.     - Acetaminophen (tylenol) or Ibuprofen (advil,motrin) as directed as needed for fever/pain. Avoid tylenol if you have a history of liver disease. Do not take ibuprofen if you have a history of GI bleeding, kidney  disease, or if you take blood thinners.   - Ibuprofen dosing for adults: 400 mg by mouth every 4-6 hours as needed. Max: 2400 mg/day; Info: use lowest effective dose, shortest effective treatment duration; give w/ food if GI upset occurs.  - Tylenol dosing for adults: [By mouth route, immediate-release form] Dose: 325-1000 mg by mouth every 4-6h as needed; Max: 1 g/4h and 4 g/day from all sources. [By mouth route, extended-release form] Dose: 650-1300 mg Extended Release by mouth every 8h as needed; Max: 4 g/day from all sources.     - You must understand that you have received an Urgent Care treatment only and that you may be released before all of your medical problems are known or treated.   - You, the patient, will arrange for follow up care as instructed.   - If your condition worsens or fails to improve we recommend that you receive another evaluation at the ER immediately or contact your PCP to discuss your concerns or return here.   - Follow up with your PCP or specialty clinic as directed in the next 1-2 weeks if not improved or as needed.  You can call [phone] to schedule an appointment with the appropriate provider.    If your symptoms do not improve or worsen, go to the emergency room immediately.

## 2024-05-22 NOTE — PROGRESS NOTES
Incoming call from patient to report vital signs:    /73   Pulse 69   SpO2 98%     She denies pain and SOB. She has cardiology appointment today with Dr. Rodriguez. Next [facility] 5/23/24 at 1100. No other questions at this time.          Time In: 0930  Time Out: 1030    Subjective  Pt had a lot of trouble after the last visit with pain in upper traps and shoulders.  The day after tx her arms were so tired she could not lift them over her head.  She is wondering if she should be spreading out treatments.       Objective  Treatment: Pt was instructed in and performed therapeutic exercises to develop B hip/core stabilization, BLE flexibility/strength and spinal mobility  for 60 minutes, 30 min one - one. Patient performed therapeutic exercises consisting of the following exercises:    Warm-up Laps 3 x each  fwd/lat     Stretches: 2 x 30 sec  HS LLE only     Levator Scap -held stretches today due to increased pain last visit.  Upper trap - held stretches today due to increased pain last visit.  T spine rotation with noodle 2 min  Seated trunk flexion stretch with noodle 2 min - np     LE exs in // bars: 25x each   Mini Squats with QS  Hip flx/ext  Hip abd/add  Hip flex/LAQ  Hip ext/HS  Hip cw/ccw circles    Hip hiking x 15 bilaterally for pelvic mobility  Football squeeze x 10  Noodle bicycle x 3 min   Balance - staggered stance 2 x 15 sec each leg     UE exs: 25x each  Shld flex/ext apo   Shld abd/add apo  Lat pull ytb- np  Rows ytb - np     Cool down laps 2 x each   Fwd/lat      Patient was HEP for pool at home including noodle bike, B hip flex for abdominal activtation in deep water and hip abd/add in deep water    Assessment  Decreased UE and overhead stretches and ex due to increased pain the day after last treatment.  Discussed exercises to carry over at home. Pt had increased right hip/back pain with balance activities. Progress slowly secondary to Complex Regional Pain Syndrome. Tx focused on BLE flexibility/strengthening and hip/core stabilization.  Will progress as tolerated. Patient will continue to benefit from skilled PT intervention.    Hattie Villalpando is making good progress towards established goals.    Anticipated barriers to physical  therapy: None    Plan  Continue with POC established by PT.     Treatment and note completed by Cheryl San, PT

## 2025-01-24 ENCOUNTER — OFFICE VISIT (OUTPATIENT)
Dept: URGENT CARE | Facility: CLINIC | Age: 60
End: 2025-01-24
Payer: COMMERCIAL

## 2025-01-24 VITALS
WEIGHT: 175 LBS | TEMPERATURE: 99 F | RESPIRATION RATE: 16 BRPM | HEART RATE: 91 BPM | DIASTOLIC BLOOD PRESSURE: 99 MMHG | BODY MASS INDEX: 29.12 KG/M2 | SYSTOLIC BLOOD PRESSURE: 138 MMHG | OXYGEN SATURATION: 96 %

## 2025-01-24 DIAGNOSIS — J10.1 INFLUENZA A: ICD-10-CM

## 2025-01-24 DIAGNOSIS — R10.9 FLANK PAIN: ICD-10-CM

## 2025-01-24 DIAGNOSIS — R05.9 COUGH, UNSPECIFIED TYPE: Primary | ICD-10-CM

## 2025-01-24 LAB
BILIRUBIN, UA POC OHS: NEGATIVE
BLOOD, UA POC OHS: ABNORMAL
CLARITY, UA POC OHS: CLEAR
COLOR, UA POC OHS: YELLOW
CTP QC/QA: YES
CTP QC/QA: YES
GLUCOSE, UA POC OHS: NEGATIVE
KETONES, UA POC OHS: NEGATIVE
LEUKOCYTES, UA POC OHS: NEGATIVE
NITRITE, UA POC OHS: NEGATIVE
PH, UA POC OHS: 5.5
POC MOLECULAR INFLUENZA A AGN: POSITIVE
POC MOLECULAR INFLUENZA B AGN: NEGATIVE
PROTEIN, UA POC OHS: NEGATIVE
SARS-COV-2 AG RESP QL IA.RAPID: NEGATIVE
SPECIFIC GRAVITY, UA POC OHS: >=1.03
UROBILINOGEN, UA POC OHS: 0.2

## 2025-01-24 PROCEDURE — 81003 URINALYSIS AUTO W/O SCOPE: CPT | Mod: QW,S$GLB,, | Performed by: NURSE PRACTITIONER

## 2025-01-24 PROCEDURE — 71046 X-RAY EXAM CHEST 2 VIEWS: CPT | Mod: FY,S$GLB,, | Performed by: RADIOLOGY

## 2025-01-24 PROCEDURE — 94640 AIRWAY INHALATION TREATMENT: CPT | Mod: S$GLB,,, | Performed by: NURSE PRACTITIONER

## 2025-01-24 PROCEDURE — 87811 SARS-COV-2 COVID19 W/OPTIC: CPT | Mod: QW,S$GLB,, | Performed by: NURSE PRACTITIONER

## 2025-01-24 PROCEDURE — 99214 OFFICE O/P EST MOD 30 MIN: CPT | Mod: 25,S$GLB,, | Performed by: NURSE PRACTITIONER

## 2025-01-24 PROCEDURE — 87502 INFLUENZA DNA AMP PROBE: CPT | Mod: QW,S$GLB,, | Performed by: NURSE PRACTITIONER

## 2025-01-24 RX ORDER — AZELASTINE 1 MG/ML
1 SPRAY, METERED NASAL 2 TIMES DAILY
Qty: 30 ML | Refills: 3 | Status: SHIPPED | OUTPATIENT
Start: 2025-01-24 | End: 2025-01-24

## 2025-01-24 RX ORDER — ALBUTEROL SULFATE 90 UG/1
2 INHALANT RESPIRATORY (INHALATION) EVERY 6 HOURS PRN
Qty: 18 G | Refills: 0 | Status: SHIPPED | OUTPATIENT
Start: 2025-01-24 | End: 2026-01-24

## 2025-01-24 RX ORDER — DEXAMETHASONE SODIUM PHOSPHATE 4 MG/ML
4 INJECTION, SOLUTION INTRA-ARTICULAR; INTRALESIONAL; INTRAMUSCULAR; INTRAVENOUS; SOFT TISSUE
Status: DISCONTINUED | OUTPATIENT
Start: 2025-01-24 | End: 2025-01-24

## 2025-01-24 RX ORDER — OSELTAMIVIR PHOSPHATE 75 MG/1
75 CAPSULE ORAL 2 TIMES DAILY
Qty: 10 CAPSULE | Refills: 0 | Status: SHIPPED | OUTPATIENT
Start: 2025-01-24 | End: 2025-01-24

## 2025-01-24 RX ORDER — NITROGLYCERIN 0.4 MG/1
0.4 TABLET SUBLINGUAL
COMMUNITY
Start: 2024-09-03 | End: 2025-09-03

## 2025-01-24 RX ORDER — OSELTAMIVIR PHOSPHATE 75 MG/1
75 CAPSULE ORAL 2 TIMES DAILY
Qty: 10 CAPSULE | Refills: 0 | Status: SHIPPED | OUTPATIENT
Start: 2025-01-24 | End: 2025-01-29

## 2025-01-24 RX ORDER — ALBUTEROL SULFATE 0.83 MG/ML
2.5 SOLUTION RESPIRATORY (INHALATION)
Status: COMPLETED | OUTPATIENT
Start: 2025-01-24 | End: 2025-01-24

## 2025-01-24 RX ORDER — KETOROLAC TROMETHAMINE 30 MG/ML
30 INJECTION, SOLUTION INTRAMUSCULAR; INTRAVENOUS ONCE
Start: 2025-01-24 | End: 2025-01-24

## 2025-01-24 RX ORDER — BENZONATATE 100 MG/1
100 CAPSULE ORAL 3 TIMES DAILY PRN
Qty: 30 CAPSULE | Refills: 0 | Status: SHIPPED | OUTPATIENT
Start: 2025-01-24 | End: 2025-02-03

## 2025-01-24 RX ORDER — IPRATROPIUM BROMIDE 0.5 MG/2.5ML
0.5 SOLUTION RESPIRATORY (INHALATION)
Status: COMPLETED | OUTPATIENT
Start: 2025-01-24 | End: 2025-01-24

## 2025-01-24 RX ORDER — PROMETHAZINE HYDROCHLORIDE AND DEXTROMETHORPHAN HYDROBROMIDE 6.25; 15 MG/5ML; MG/5ML
5 SYRUP ORAL EVERY 4 HOURS PRN
Qty: 50 ML | Refills: 0 | Status: SHIPPED | OUTPATIENT
Start: 2025-01-24 | End: 2025-02-03

## 2025-01-24 RX ORDER — KETOROLAC TROMETHAMINE 30 MG/ML
30 INJECTION, SOLUTION INTRAMUSCULAR; INTRAVENOUS ONCE
Status: DISCONTINUED | OUTPATIENT
Start: 2025-01-24 | End: 2025-01-24

## 2025-01-24 RX ORDER — ALBUTEROL SULFATE 90 UG/1
2 INHALANT RESPIRATORY (INHALATION) EVERY 6 HOURS PRN
Qty: 18 G | Refills: 0 | Status: SHIPPED | OUTPATIENT
Start: 2025-01-24 | End: 2025-01-24

## 2025-01-24 RX ORDER — IPRATROPIUM BROMIDE 0.5 MG/2.5ML
500 SOLUTION RESPIRATORY (INHALATION) 4 TIMES DAILY
Qty: 75 ML | Refills: 0 | Status: SHIPPED | OUTPATIENT
Start: 2025-01-24 | End: 2025-01-24 | Stop reason: CLARIF

## 2025-01-24 RX ORDER — AZELASTINE 1 MG/ML
1 SPRAY, METERED NASAL 2 TIMES DAILY
Qty: 30 ML | Refills: 3 | Status: SHIPPED | OUTPATIENT
Start: 2025-01-24 | End: 2026-01-24

## 2025-01-24 RX ADMIN — ALBUTEROL SULFATE 2.5 MG: 0.83 SOLUTION RESPIRATORY (INHALATION) at 06:01

## 2025-01-24 RX ADMIN — IPRATROPIUM BROMIDE 0.5 MG: 0.5 SOLUTION RESPIRATORY (INHALATION) at 06:01

## 2025-01-24 NOTE — PROGRESS NOTES
Subjective:      Patient ID: Hattie Villalpando is a 59 y.o. female.    Vitals:  weight is 79.4 kg (175 lb). Her oral temperature is 98.9 °F (37.2 °C). Her blood pressure is 138/99 (abnormal) and her pulse is 91. Her respiration is 16 and oxygen saturation is 96%.     Chief Complaint: Cough    This is a 59 y.o. female who presents today with a chief complaint of productive cough (tinged with blood), chest congestion, sore throat, back px and headache since yesterday. Pt has had post nasal drip, nausea, vomiting, diarrhea x 1 month. No fever, nasal congestion, ear px, ear congestion or abd px     Home tx: phenergan DM, Vit C, cough drops    [facility]: ENT 2 weeks ago and given abx; autoimmune (IVIG); blood thinners    Provider note:  Started with above symptoms yesterday. Cough with blood and hoarse voice has been 1 mth  Also has hx of kidney stones and is having right flank pain    Cough  This is a new problem. The current episode started yesterday. The problem has been gradually worsening. The problem occurs constantly. The cough is Productive of sputum. Associated symptoms include headaches, myalgias and a sore throat. Pertinent negatives include no chest pain, ear congestion, ear pain, fever, nasal congestion or postnasal drip. Her past medical history is significant for pneumonia. There is no history of asthma, bronchitis or environmental allergies.       Constitution: Negative for fever.   HENT:  Positive for sore throat. Negative for ear pain and postnasal drip.    Cardiovascular:  Negative for chest pain.   Respiratory:  Positive for cough.    Musculoskeletal:  Positive for muscle ache.   Allergic/Immunologic: Negative for environmental allergies.   Neurological:  Positive for headaches.      Objective:     Physical Exam   Constitutional: She is oriented to person, place, and time. She appears well-developed. She is cooperative.  Non-toxic appearance. She does not appear ill. No distress.   HENT:   Head: Normocephalic  and atraumatic.   Ears:   Right Ear: Hearing, external ear and ear canal normal. Tympanic membrane is erythematous. A middle ear effusion is present.   Left Ear: Hearing, external ear and ear canal normal. Tympanic membrane is erythematous. A middle ear effusion is present.   Nose: Nose normal. No mucosal edema, rhinorrhea or nasal deformity. No epistaxis. Right sinus exhibits no maxillary sinus tenderness and no frontal sinus tenderness. Left sinus exhibits no maxillary sinus tenderness and no frontal sinus tenderness.   Mouth/Throat: Uvula is midline and mucous membranes are normal. No trismus in the jaw. Normal dentition. No uvula swelling. Posterior oropharyngeal erythema present. No oropharyngeal exudate or posterior oropharyngeal edema.   Eyes: Conjunctivae and lids are normal. No scleral icterus.   Neck: Trachea normal and phonation normal. Neck supple. No edema present. No erythema present. No neck rigidity present.   Cardiovascular: Normal rate, regular rhythm, normal heart sounds and normal pulses.   Pulmonary/Chest: Effort normal. No respiratory distress. She has no decreased breath sounds. She has wheezes in the right upper field and the left upper field. She has rhonchi in the right middle field.   Abdominal: Normal appearance. There is right CVA tenderness.   Musculoskeletal: Normal range of motion.         General: No deformity. Normal range of motion.   Neurological: She is alert and oriented to person, place, and time. She exhibits normal muscle tone. Coordination normal.   Skin: Skin is warm, dry, intact, not diaphoretic and not pale.   Psychiatric: Her speech is normal and behavior is normal. Judgment and thought content normal.   Nursing note and vitals reviewed.    Wheezes throughout after nebulizer treatment  Assessment:     1. Cough, unspecified type    2. Influenza A    3. Flank pain      Results for orders placed or performed in visit on 01/24/25   POCT Urinalysis(Instrument)    Collection  Time: 01/24/25  5:51 PM   Result Value Ref Range    Color, POC UA Yellow Yellow, Straw, Colorless    Clarity, POC UA Clear Clear    Glucose, POC UA Negative Negative    Bilirubin, POC UA Negative Negative    Ketones, POC UA Negative Negative    Spec Grav POC UA >=1.030 1.005 - 1.030    Blood, POC UA Small (A) Negative    pH, POC UA 5.5 5.0 - 8.0    Protein, POC UA Negative Negative    Urobilinogen, POC UA 0.2 <=1.0    Nitrite, POC UA Negative Negative    WBC, POC UA Negative Negative   POCT Influenza A/B MOLECULAR    Collection Time: 01/24/25  5:56 PM   Result Value Ref Range    POC Molecular Influenza A Ag Positive (A) Negative    POC Molecular Influenza B Ag Negative Negative     Acceptable Yes    SARS Coronavirus 2 Antigen, POCT Manual Read    Collection Time: 01/24/25  5:56 PM   Result Value Ref Range    SARS Coronavirus 2 Antigen Negative Negative     Acceptable Yes       Patient refused Toradol and Dexamethasone IM  Discussed allergies and patients stated she has taken tamiflu before and requested tamiflu.     EXAMINATION:  XR CHEST PA AND LATERAL     CLINICAL HISTORY:  Cough, unspecified     TECHNIQUE:  PA and lateral views of the chest were performed.     COMPARISON:  Chest radiograph 08/22/2021 and CT renal stone study 10/07/2013     FINDINGS:  The lungs are well expanded and clear, with normal appearance of pulmonary vasculature and no pleural effusion or pneumothorax.     The cardiac silhouette is normal in size. The hilar and mediastinal contours are unremarkable.     Partially imaged lower ACDF hardware again noted.  Right-sided  shunt catheter again noted.  No acute osseous process seen.     Impression:     No radiographic evidence of pneumonia or other source of cough/shortness of breath, noting that early/mild viral pneumonia or nonspecific bronchitis may be radiographically occult.  Plan:       Cough, unspecified type  -     POCT Influenza A/B MOLECULAR  -     SARS  Coronavirus 2 Antigen, POCT Manual Read  -     POCT Urinalysis(Instrument)  -     XR CHEST PA AND LATERAL; Future; Expected date: 01/24/2025  -     Discontinue: azelastine (ASTELIN) 137 mcg (0.1 %) nasal spray; 1 spray (137 mcg total) by Nasal route 2 (two) times daily.  Dispense: 30 mL; Refill: 3  -     azelastine (ASTELIN) 137 mcg (0.1 %) nasal spray; 1 spray (137 mcg total) by Nasal route 2 (two) times daily.  Dispense: 30 mL; Refill: 3    Influenza A  -     XR CHEST PA AND LATERAL; Future; Expected date: 01/24/2025  -     Discontinue: albuterol (VENTOLIN HFA) 90 mcg/actuation inhaler; Inhale 2 puffs into the lungs every 6 (six) hours as needed for Wheezing. Rescue  Dispense: 18 g; Refill: 0  -     albuterol nebulizer solution 2.5 mg  -     Discontinue: ipratropium (ATROVENT) 0.02 % nebulizer solution; Take 2.5 mLs (500 mcg total) by nebulization 4 (four) times daily. Rescue  Dispense: 75 mL; Refill: 0  -     Discontinue: dexAMETHasone injection 4 mg  -     Discontinue: azelastine (ASTELIN) 137 mcg (0.1 %) nasal spray; 1 spray (137 mcg total) by Nasal route 2 (two) times daily.  Dispense: 30 mL; Refill: 3  -     ipratropium 0.02 % nebulizer solution 0.5 mg  -     Discontinue: oseltamivir (TAMIFLU) 75 MG capsule; Take 1 capsule (75 mg total) by mouth 2 (two) times daily. for 5 days  Dispense: 10 capsule; Refill: 0  -     albuterol (VENTOLIN HFA) 90 mcg/actuation inhaler; Inhale 2 puffs into the lungs every 6 (six) hours as needed for Wheezing. Rescue  Dispense: 18 g; Refill: 0  -     azelastine (ASTELIN) 137 mcg (0.1 %) nasal spray; 1 spray (137 mcg total) by Nasal route 2 (two) times daily.  Dispense: 30 mL; Refill: 3  -     oseltamivir (TAMIFLU) 75 MG capsule; Take 1 capsule (75 mg total) by mouth 2 (two) times daily. for 5 days  Dispense: 10 capsule; Refill: 0    Flank pain  -     Discontinue: ketorolac injection 30 mg  -     Discontinue: ketorolac (TORADOL) 30 mg/mL (1 mL) injection; Inject 1 mL (30 mg total)  into the muscle once. for 1 dose    Other orders  -     benzonatate (TESSALON) 100 MG capsule; Take 1 capsule (100 mg total) by mouth 3 (three) times daily as needed.  Dispense: 30 capsule; Refill: 0    Discussed unable to get CT scan here at urgent Care  If she is having uncontrolled pain to her flank she should go to the ED for further evaluation of kidney stone, she verbalized understanding    Please drink plenty of fluids.  Please get plenty of rest.  Please return here or go to the Emergency Department for any concerns or worsening of condition.  Tamiflu prescription has been discussed and if prescribed, please take to completion unless you cannot tolerate the side effects.   If you were prescribed a narcotic medication, do not drive or operate heavy equipment or machinery while taking these medications.  If you were given a steroid shot in the clinic and have also been given a prescription for a steroid such as Prednisone or a Medrol Dose Pack, please begin taking them tomorrow.  If you do not have Hypertension or any history of palpitations, it is ok to take over the counter Sudafed or Mucinex D or Allegra-D or Claritin-D or Zyrtec-D.  If you do take one of the above, it is ok to combine that with plain over the counter Mucinex or Allegra or Claritin or Zyrtec.  If for example you are taking Zyrtec -D, you can combine that with Mucinex, but not Mucinex-D.  If you are taking Mucinex-D, you can combine that with plain Allegra or Claritin or Zyrtec.   If you do have Hypertension or palpitations, it is safe to take Coricidin HBP for relief of sinus symptoms.  If not allergic, please take over the counter Tylenol (Acetaminophen) and/or Motrin (Ibuprofen) as directed for control of pain and/or fever.  Please follow up with your primary care doctor or specialist as needed.    If you  smoke, please stop smoking.                    English

## 2025-01-25 NOTE — PATIENT INSTRUCTIONS
Discussed unable to get CT scan here at urgent Care  If she is having uncontrolled pain to her flank she should go to the ED for further evaluation of kidney stone, she verbalized understanding    Please drink plenty of fluids.  Please get plenty of rest.  Please return here or go to the Emergency Department for any concerns or worsening of condition.  Tamiflu prescription has been discussed and if prescribed, please take to completion unless you cannot tolerate the side effects.   If you were prescribed a narcotic medication, do not drive or operate heavy equipment or machinery while taking these medications.  If you were given a steroid shot in the clinic and have also been given a prescription for a steroid such as Prednisone or a Medrol Dose Pack, please begin taking them tomorrow.  If you do not have Hypertension or any history of palpitations, it is ok to take over the counter Sudafed or Mucinex D or Allegra-D or Claritin-D or Zyrtec-D.  If you do take one of the above, it is ok to combine that with plain over the counter Mucinex or Allegra or Claritin or Zyrtec.  If for example you are taking Zyrtec -D, you can combine that with Mucinex, but not Mucinex-D.  If you are taking Mucinex-D, you can combine that with plain Allegra or Claritin or Zyrtec.   If you do have Hypertension or palpitations, it is safe to take Coricidin HBP for relief of sinus symptoms.  If not allergic, please take over the counter Tylenol (Acetaminophen) and/or Motrin (Ibuprofen) as directed for control of pain and/or fever.  Please follow up with your primary care doctor or specialist as needed.    If you  smoke, please stop smoking.

## (undated) DEVICE — KIT ENDOKIT COMPLIANCE CUSTOM